# Patient Record
Sex: MALE | Race: WHITE | Employment: OTHER | URBAN - METROPOLITAN AREA
[De-identification: names, ages, dates, MRNs, and addresses within clinical notes are randomized per-mention and may not be internally consistent; named-entity substitution may affect disease eponyms.]

---

## 2017-04-01 ENCOUNTER — TRANSFERRED RECORDS (OUTPATIENT)
Dept: HEALTH INFORMATION MANAGEMENT | Facility: CLINIC | Age: 67
End: 2017-04-01

## 2017-10-03 ENCOUNTER — OFFICE VISIT (OUTPATIENT)
Dept: FAMILY MEDICINE | Facility: CLINIC | Age: 67
End: 2017-10-03
Payer: COMMERCIAL

## 2017-10-03 VITALS
HEIGHT: 70 IN | BODY MASS INDEX: 42.86 KG/M2 | WEIGHT: 299.4 LBS | TEMPERATURE: 97.8 F | OXYGEN SATURATION: 95 % | HEART RATE: 54 BPM | DIASTOLIC BLOOD PRESSURE: 88 MMHG | SYSTOLIC BLOOD PRESSURE: 138 MMHG

## 2017-10-03 DIAGNOSIS — Z23 NEED FOR PROPHYLACTIC VACCINATION AND INOCULATION AGAINST INFLUENZA: ICD-10-CM

## 2017-10-03 DIAGNOSIS — E66.01 MORBID OBESITY (H): ICD-10-CM

## 2017-10-03 DIAGNOSIS — G47.33 OSA (OBSTRUCTIVE SLEEP APNEA): ICD-10-CM

## 2017-10-03 DIAGNOSIS — Z00.00 MEDICARE ANNUAL WELLNESS VISIT, SUBSEQUENT: Primary | ICD-10-CM

## 2017-10-03 DIAGNOSIS — Z13.6 CARDIOVASCULAR SCREENING; LDL GOAL LESS THAN 160: ICD-10-CM

## 2017-10-03 PROCEDURE — 90471 IMMUNIZATION ADMIN: CPT | Performed by: PHYSICIAN ASSISTANT

## 2017-10-03 PROCEDURE — 90662 IIV NO PRSV INCREASED AG IM: CPT | Performed by: PHYSICIAN ASSISTANT

## 2017-10-03 PROCEDURE — 99387 INIT PM E/M NEW PAT 65+ YRS: CPT | Mod: 25 | Performed by: PHYSICIAN ASSISTANT

## 2017-10-03 NOTE — PROGRESS NOTES
Injectable Influenza Immunization Documentation    1.  Is the person to be vaccinated sick today?   No    2. Does the person to be vaccinated have an allergy to a component   of the vaccine?   No    3. Has the person to be vaccinated ever had a serious reaction   to influenza vaccine in the past?   No    4. Has the person to be vaccinated ever had Guillain-Barré syndrome?   No    Form completed by Kenan Hooper CMA

## 2017-10-03 NOTE — PROGRESS NOTES
SUBJECTIVE:   Toby Zuluaga is a 66 year old male who presents for Preventive Visit.      Are you in the first 12 months of your Medicare coverage?  No    HPI    COGNITIVE SCREEN  1) Repeat 3 items (Banana, Sunrise, Chair)    2) Clock draw: NORMAL  3) 3 item recall: Recalls 3 objects  Results: 3 items recalled: COGNITIVE IMPAIRMENT LESS LIKELY    Mini-CogTM Copyright EVA Dempsey. Licensed by the author for use in [facility]; reprinted with permission ([email or web address]). All rights reserved.          Reviewed and updated as needed this visit by clinical staffTobacco  Allergies  Meds  Problems  Med Hx  Surg Hx  Fam Hx  Soc Hx          Reviewed and updated as needed this visit by Provider  Meds  Problems        Social History   Substance Use Topics     Smoking status: Never Smoker     Smokeless tobacco: Never Used     Alcohol use Yes      Comment: occ       The patient does not drink >3 drinks per day nor >7 drinks per week.              Today's PHQ-2 Score: No flowsheet data found.    Do you feel safe in your environment - Yes    Do you have a Health Care Directive?: No: Advance care planning reviewed with patient; information given to patient to review.      Current providers sharing in care for this patient include: Patient Care Team:  Rodri Greer PA-C as PCP - General (Physician Assistant)      Hearing impairment: No    Ability to successfully perform activities of daily living: Yes, no assistance needed     Fall risk:  Fallen 2 or more times in the past year?: No  Any fall with injury in the past year?: No      Home safety:  none identified      The following health maintenance items are reviewed in Epic and correct as of today:Health Maintenance   Topic Date Due     HEPATITIS C SCREENING  10/11/1967     LIPID SCREEN Q5 YR MALE (SYSTEM ASSIGNED)  10/11/1984     COLON CANCER SCREEN (SYSTEM ASSIGNED)  10/11/1999     ADVANCE DIRECTIVE PLANNING Q5 YRS  10/11/2004     TETANUS IMMUNIZATION  "(SYSTEM ASSIGNED)  2009     FALL RISK ASSESSMENT  10/11/2014     PNEUMOCOCCAL (1 of 2 - PCV13) 10/11/2014     AORTIC ANEURYSM SCREENING (SYSTEM ASSIGNED)  10/11/2014     INFLUENZA VACCINE (SYSTEM ASSIGNED)  2017     {Chronicprobdata (Optional):996663}    {Decision Support (Optional):273186}    ROS:  {ROS COMP:797634}    OBJECTIVE:   /88 (BP Location: Right arm, Cuff Size: Adult Large)  Pulse 54  Temp 97.8  F (36.6  C) (Oral)  Ht 5' 10.25\" (1.784 m)  Wt 299 lb 6.4 oz (135.8 kg)  SpO2 95%  BMI 42.65 kg/m2 Estimated body mass index is 42.65 kg/(m^2) as calculated from the following:    Height as of this encounter: 5' 10.25\" (1.784 m).    Weight as of this encounter: 299 lb 6.4 oz (135.8 kg).  EXAM:   {Exam :502714}    ASSESSMENT / PLAN:   {Diag Picklist:264665}    End of Life Planning:  Patient currently has an advanced directive: { :393158}    COUNSELING:  {Medicare Counselin}    {BP Counseling- Complete if BP >= 120/80  (Optional):682554}    Estimated body mass index is 42.65 kg/(m^2) as calculated from the following:    Height as of this encounter: 5' 10.25\" (1.784 m).    Weight as of this encounter: 299 lb 6.4 oz (135.8 kg).  {Weight Management Plan -- Complete if patient has an normal BMI (Optional):944748}   reports that he has never smoked. He has never used smokeless tobacco.  {Tobacco Cessation -- Complete if patient is a smoker (Optional):835922}    Appropriate preventive services were discussed with this patient, including applicable screening as appropriate for cardiovascular disease, diabetes, osteopenia/osteoporosis, and glaucoma.  As appropriate for age/gender, discussed screening for colorectal cancer, prostate cancer, breast cancer, and cervical cancer. Checklist reviewing preventive services available has been given to the patient.    Reviewed patients plan of care and provided an AVS. The {CarePlan:506521} for Toby meets the Care Plan requirement. This Care Plan has " "been established and reviewed with the {PATIENT, FAMILY MEMBER, CAREGIVER:846485}.    Counseling Resources:  ATP IV Guidelines  Pooled Cohorts Equation Calculator  Breast Cancer Risk Calculator  FRAX Risk Assessment  ICSI Preventive Guidelines  Dietary Guidelines for Americans, 2010  USDA's MyPlate  ASA Prophylaxis  Lung CA Screening    Rodri Greer PA-C  [facility]  Injectable Influenza Immunization Documentation    1.  Is the person to be vaccinated sick today?   No    2. Does the person to be vaccinated have an allergy to a component   of the vaccine?   No    3. Has the person to be vaccinated ever had a serious reaction   to influenza vaccine in the past?   No    4. Has the person to be vaccinated ever had Guillain-Barré syndrome?   No    Form completed by Keann Hooper CMA           SUBJECTIVE:   Toby Zuluaga is a 66 year old male who presents for Preventive Visit.  {PVP to remind patient that this is not necessarily a physical exam; physical exam may or may not be done:444011::\"click delete button to remove this line now\"}  {PVP to inform patient that additional E&M charge may apply, if additional problems addressed:919106::\"click delete button to remove this line now\"}  Are you in the first 12 months of your Medicare Part B coverage?  {No Yes:674243::\"No\"}    Healthy Habits:    Do you get at least three servings of calcium containing foods daily (dairy, green leafy vegetables, etc.)? {YES/NO, DAIRY INTAKE:476077::\"yes\"}    Amount of exercise or daily activities, outside of work: {AMOUNT EXERCISE:588007}    Problems taking medications regularly {Yes /No default:115047::\"No\"}    Medication side effects: {Yes /No default.:189601::\"No\"}    Have you had an eye exam in the past two years? {YESNOBLANK:608603}    Do you see a dentist twice per year? {YESNOBLANK:020949}    Do you have sleep apnea, excessive snoring or daytime drowsiness?{YESNOBLANK:257164}    {AWV Cognitive " "Screenin}    {Outside tests to abstract? :697656}    {additional problems to add (Optional):041360}    Reviewed and updated as needed this visit by clinical staff  Tobacco  Allergies  Meds  Problems  Med Hx  Surg Hx  Fam Hx  Soc Hx          Reviewed and updated as needed this visit by Provider  Meds  Problems        Social History   Substance Use Topics     Smoking status: Never Smoker     Smokeless tobacco: Never Used     Alcohol use Yes      Comment: occ       {ETOH AUDIT:464323}    Today's PHQ-2 Score: No flowsheet data found.  {PHQ-2 LOOK IN ASSESSMENTS (Optional) :576665}  Do you feel safe in your environment - {YES/NO/NA:942901}    Do you have a Health Care Directive?: {HEALTHCARE DIRECTIVE STATUS:910917}    Current providers sharing in care for this patient include:   Patient Care Team:  Rodri Greer PA-C as PCP - General (Physician Assistant)      Hearing impairment: {NO/YES:722940}    Ability to successfully perform activities of daily living: {YES/NO (MEDICARE):757447::\"Yes, no assistance needed\"}     Fall risk:  {Document Fall Risk in the Assessments Section of the Navigator:065708}    Home safety:  {IPPE SAFETY CONCERNS:929819::\"none identified\"}  {If any of the above assessments are answered yes, consider ordering appropriate referrals (Optional):003739::\"click delete button to remove this line now\"}    The following health maintenance items are reviewed in Epic and correct as of today:  Health Maintenance   Topic Date Due     HEPATITIS C SCREENING  10/11/1967     LIPID SCREEN Q5 YR MALE (SYSTEM ASSIGNED)  10/11/1984     COLON CANCER SCREEN (SYSTEM ASSIGNED)  10/11/1999     ADVANCE DIRECTIVE PLANNING Q5 YRS  10/11/2004     TETANUS IMMUNIZATION (SYSTEM ASSIGNED)  2009     FALL RISK ASSESSMENT  10/11/2014     PNEUMOCOCCAL (1 of 2 - PCV13) 10/11/2014     AORTIC ANEURYSM SCREENING (SYSTEM ASSIGNED)  10/11/2014     INFLUENZA VACCINE (SYSTEM ASSIGNED)  2017 " "    {Chronicprobdata (Optional):133966}    {Decision Support (Optional):710314}    ROS:  {ROS COMP:548267}    OBJECTIVE:   /88 (BP Location: Right arm, Cuff Size: Adult Large)  Pulse 54  Temp 97.8  F (36.6  C) (Oral)  Ht 5' 10.25\" (1.784 m)  Wt 299 lb 6.4 oz (135.8 kg)  SpO2 95%  BMI 42.65 kg/m2 Estimated body mass index is 42.65 kg/(m^2) as calculated from the following:    Height as of this encounter: 5' 10.25\" (1.784 m).    Weight as of this encounter: 299 lb 6.4 oz (135.8 kg).  EXAM:   {Exam :910187}    ASSESSMENT / PLAN:   {Diag Picklist:085531}    End of Life Planning:  Patient currently has an advanced directive: { :404603}    COUNSELING:  {Medicare Counselin}    {BP Counseling- Complete if BP >= 120/80  (Optional):922579}    Estimated body mass index is 42.65 kg/(m^2) as calculated from the following:    Height as of this encounter: 5' 10.25\" (1.784 m).    Weight as of this encounter: 299 lb 6.4 oz (135.8 kg).  {Weight Management Plan -- Complete if patient has an abnormal BMI (Optional):860705}   reports that he has never smoked. He has never used smokeless tobacco.  {Tobacco Cessation -- Complete if patient is a smoker (Optional):919387}    Appropriate preventive services were discussed with this patient, including applicable screening as appropriate for cardiovascular disease, diabetes, osteopenia/osteoporosis, and glaucoma.  As appropriate for age/gender, discussed screening for colorectal cancer, prostate cancer, breast cancer, and cervical cancer. Checklist reviewing preventive services available has been given to the patient.    Reviewed patients plan of care and provided an AVS. The {CarePlan:044005} for Toby meets the Care Plan requirement. This Care Plan has been established and reviewed with the {PATIENT, FAMILY MEMBER, CAREGIVER:905786}.    Counseling Resources:  ATP IV Guidelines  Pooled Cohorts Equation Calculator  Breast Cancer Risk Calculator  FRAX Risk Assessment  ICSI " Preventive Guidelines  Dietary Guidelines for Americans, 2010  USDA's MyPlate  ASA Prophylaxis  Lung CA Screening    Rodri Greer PA-C  [facility]

## 2017-10-03 NOTE — NURSING NOTE
"Chief Complaint   Patient presents with     Establish Care       Initial /88 (BP Location: Right arm, Cuff Size: Adult Large)  Pulse 54  Temp 97.8  F (36.6  C) (Oral)  Ht 5' 10.25\" (1.784 m)  Wt 299 lb 6.4 oz (135.8 kg)  SpO2 95%  BMI 42.65 kg/m2 Estimated body mass index is 42.65 kg/(m^2) as calculated from the following:    Height as of this encounter: 5' 10.25\" (1.784 m).    Weight as of this encounter: 299 lb 6.4 oz (135.8 kg).  Medication Reconciliation: complete   Kenan Hooper CMA      "

## 2017-10-03 NOTE — MR AVS SNAPSHOT
"              After Visit Summary   10/3/2017    Toby Zuluaga    MRN: [id number]           Patient Information     Date Of Birth          1950        Visit Information        Provider Department      10/3/2017 9:00 AM Rodri Greer PA-C [facility]        Today's Diagnoses     Medicare annual wellness visit, subsequent    -  1    Morbid obesity (H)        JAIME (obstructive sleep apnea)        CARDIOVASCULAR SCREENING; LDL GOAL LESS THAN 160        Need for prophylactic vaccination and inoculation against influenza           Follow-ups after your visit        Your next 10 appointments already scheduled     Oct 03, 2017  9:45 AM CDT   Nurse Only with [facility]INIC NURSE   [facility]    [address] [address] [phone]              Who to contact     If you have questions or need follow up information about today's clinic visit or your schedule please contact [facility] directly at [phone].  Normal or non-critical lab and imaging results will be communicated to you by LoraxAghart, letter or phone within 4 business days after the clinic has received the results. If you do not hear from us within 7 days, please contact the clinic through LoraxAghart or phone. If you have a critical or abnormal lab result, we will notify you by phone as soon as possible.  Submit refill requests through Find That File or call your pharmacy and they will forward the refill request to us. Please allow 3 business days for your refill to be completed.          Additional Information About Your Visit        LoraxAghart Information     Find That File lets you send messages to your doctor, view your test results, renew your prescriptions, schedule appointments and more. To sign up, go to [email or web address] . Click on \"Log in\" on the left side of the screen, which will take you to the Welcome page. Then click on \"Sign up Now\" on the right " "side of the page.     You will be asked to enter the access code listed below, as well as some personal information. Please follow the directions to create your username and password.     Your access code is: [email or web address]  Expires: 2018  9:32 AM     Your access code will  in 90 days. If you need help or a new code, please call your [place] clinic or [phone].        Care EveryWhere ID     This is your Care EveryWhere ID. This could be used by other organizations to access your [place] medical records  [id number]        Your Vitals Were     Pulse Temperature Height Pulse Oximetry BMI (Body Mass Index)       54 97.8  F (36.6  C) (Oral) 5' 10.25\" (1.784 m) 95% 42.65 kg/m2        Blood Pressure from Last 3 Encounters:   10/03/17 138/88    Weight from Last 3 Encounters:   10/03/17 299 lb 6.4 oz (135.8 kg)              We Performed the Following     Vaccine Administration, Initial [73999]        Primary Care Provider Office Phone # Fax #    Rodri Greer PA-C [phone]       [address] [facility] [address]        Equal Access to Services     ZAID ESCOBEDO : Hadii aad ku hadasho Soomaali, waaxda luqadaha, qaybta kaalmada adeegyada, waxay idiin hayaan jolieeg kharanoe lavon . So [facility] [phone].    ATENCIÓN: Si habla español, tiene a wiseman disposición servicios gratuitos de asistencia lingüística. Llame al [phone].    We comply with applicable federal civil rights laws and [place] laws. We do not discriminate on the basis of race, color, national origin, age, disability, sex, sexual orientation, or gender identity.            Thank you!     Thank you for choosing [facility]  for your care. Our goal is always to provide you with excellent care. Hearing back from our patients is one way we can continue to improve our services. Please take a few minutes to complete the written survey that you may receive in the mail after your visit with us. Thank you!      "        Your Updated Medication List - Protect others around you: Learn how to safely use, store and throw away your medicines at www.disposemymeds.org.      Notice  As of 10/3/2017  9:32 AM    You have not been prescribed any medications.

## 2017-10-03 NOTE — PROGRESS NOTES
SUBJECTIVE:   Toby Zuluaga is a 66 year old male who presents for Preventive Visit.    Are you in the first 12 months of your Medicare coverage?  No    Physical   Annual:     Getting at least 3 servings of Calcium per day::  Yes    Bi-annual eye exam::  Yes    Dental care twice a year::  Yes    Sleep apnea or symptoms of sleep apnea::  Sleep apnea    Diet::  Regular (no restrictions) and Carbohydrate counting    Taking medications regularly::  Yes    Medication side effects::  None    Additional concerns today::  No    Here to establish care with a primary clinic and physician.   And he is here today to do a wellness exam  Overall he feels well, no major medical concerns  Tore his rotator cuff this summer, fell off ladder (left)  Doing physical therapy     He is working on weight loss with wife  Improved diet  Doing a lot more walking      COGNITIVE SCREEN  1) Repeat 3 items (Banana, Sunrise, Chair)    2) Clock draw: NORMAL  3) 3 item recall: Recalls 3 objects  Results: 3 items recalled: COGNITIVE IMPAIRMENT LESS LIKELY    Mini-CogTM Copyright EVA Dempsey. Licensed by the author for use in [facility]; reprinted with permission ([email or web address]). All rights reserved.          Reviewed and updated as needed this visit by clinical staffTobacco  Allergies  Meds  Problems  Med Hx  Surg Hx  Fam Hx  Soc Hx          Reviewed and updated as needed this visit by Provider  Meds  Problems        Social History   Substance Use Topics     Smoking status: Never Smoker     Smokeless tobacco: Never Used     Alcohol use Yes      Comment: occ       The patient does not drink >3 drinks per day nor >7 drinks per week.        Today's PHQ-2 Score: PHQ-2 ( 1999 Pfizer) 10/3/2017   Q1: Little interest or pleasure in doing things 0   Q2: Feeling down, depressed or hopeless 0   PHQ-2 Score 0   Q1: Little interest or pleasure in doing things Not at all   Q2: Feeling down, depressed or hopeless Not at all   PHQ-2 Score 0  "      Do you feel safe in your environment - Yes    Do you have a Health Care Directive?: No: Advance care planning reviewed with patient; information given to patient to review.      Current providers sharing in care for this patient include: Patient Care Team:  Rodri Greer PA-C as PCP - General (Physician Assistant)      Hearing impairment: No    Ability to successfully perform activities of daily living: Yes, no assistance needed     Fall risk:  Fallen 2 or more times in the past year?: No  Any fall with injury in the past year?: No      Home safety:  none identified      The following health maintenance items are reviewed in Epic and correct as of today:Health Maintenance   Topic Date Due     HEPATITIS C SCREENING  10/11/1967     LIPID SCREEN Q5 YR MALE (SYSTEM ASSIGNED)  10/11/1984     COLON CANCER SCREEN (SYSTEM ASSIGNED)  10/11/1999     ADVANCE DIRECTIVE PLANNING Q5 YRS  10/11/2004     TETANUS IMMUNIZATION (SYSTEM ASSIGNED)  05/28/2009     FALL RISK ASSESSMENT  10/11/2014     PNEUMOCOCCAL (1 of 2 - PCV13) 10/11/2014     AORTIC ANEURYSM SCREENING (SYSTEM ASSIGNED)  10/11/2014     INFLUENZA VACCINE (SYSTEM ASSIGNED)  09/01/2017     Labs reviewed in EPIC    Reports up to date on pneumonia vaccine    ROS:  Constitutional, HEENT, cardiovascular, pulmonary, gi and gu systems are negative, except as otherwise noted.      OBJECTIVE:   /88 (BP Location: Right arm, Cuff Size: Adult Large)  Pulse 54  Temp 97.8  F (36.6  C) (Oral)  Ht 5' 10.25\" (1.784 m)  Wt 299 lb 6.4 oz (135.8 kg)  SpO2 95%  BMI 42.65 kg/m2 Estimated body mass index is 42.65 kg/(m^2) as calculated from the following:    Height as of this encounter: 5' 10.25\" (1.784 m).    Weight as of this encounter: 299 lb 6.4 oz (135.8 kg).    EXAM:   GENERAL: healthy, alert and no distress  RESP: lungs clear to auscultation - no rales, rhonchi or wheezes  CV: regular rate and rhythm, normal S1 S2, no S3 or S4, no murmur, click or rub, no " "peripheral edema and peripheral pulses strong  MS: no peripheral edema    ASSESSMENT / PLAN:   1. Medicare annual wellness visit, subsequent  Reviewed recommended updates for preventive care. Sounds like he it UTD on many of these. Will await his records    2. Morbid obesity (H)  Recommended strategies for loss. Does need to get moving more    3. JAIME (obstructive sleep apnea)  Wearing cpap nightly    4. CARDIOVASCULAR SCREENING; LDL GOAL LESS THAN 160      5. Need for prophylactic vaccination and inoculation against influenza  - Vaccine Administration, Initial [20382]  - FLU VACCINE, INCREASED ANTIGEN, PRESV FREE, AGE 65+ [49569]  - Vaccine Administration, Initial [18703]    End of Life Planning:  Patient currently has an advanced directive: No.  I have verified the patient's ablity to prepare an advanced directive/make health care decisions.  Literature was provided to assist patient in preparing an advanced directive.    COUNSELING:  Reviewed preventive health counseling, as reflected in patient instructions       Regular exercise       Healthy diet/nutrition       Hepatitis C screening       Colon cancer screening       Prostate cancer screening    BP Screening:   Last 3 BP Readings:    BP Readings from Last 3 Encounters:   10/03/17 138/88       The following was recommended to the patient:  Re-screen BP within a year and recommended lifestyle modifications    Estimated body mass index is 42.65 kg/(m^2) as calculated from the following:    Height as of this encounter: 5' 10.25\" (1.784 m).    Weight as of this encounter: 299 lb 6.4 oz (135.8 kg).  Weight management plan: Discussed healthy diet and exercise guidelines and patient will follow up in 12 months in clinic to re-evaluate.   reports that he has never smoked. He has never used smokeless tobacco.        Appropriate preventive services were discussed with this patient, including applicable screening as appropriate for cardiovascular disease, diabetes, " osteopenia/osteoporosis, and glaucoma.  As appropriate for age/gender, discussed screening for colorectal cancer, prostate cancer, breast cancer, and cervical cancer. Checklist reviewing preventive services available has been given to the patient.    Reviewed patients plan of care and provided an AVS. The Basic Care Plan (routine screening as documented in Health Maintenance) for Toby meets the Care Plan requirement. This Care Plan has been established and reviewed with the Patient.    Counseling Resources:  ATP IV Guidelines  Pooled Cohorts Equation Calculator  Breast Cancer Risk Calculator  FRAX Risk Assessment  ICSI Preventive Guidelines  Dietary Guidelines for Americans, 2010  [employer]'s MyPlate  ASA Prophylaxis  Lung CA Screening    Rodri Greer PA-C  [facility] ROSEMOUNTAnswers for HPI/ROS submitted by the patient on 10/3/2017   PHQ-2 Score: 0    Injectable Influenza Immunization Documentation    1.  Is the person to be vaccinated sick today?   No    2. Does the person to be vaccinated have an allergy to a component   of the vaccine?   No    3. Has the person to be vaccinated ever had a serious reaction   to influenza vaccine in the past?   No    4. Has the person to be vaccinated ever had Guillain-Barré syndrome?   No    Form completed by Kenan Hooper CMA

## 2017-10-05 ENCOUNTER — DOCUMENTATION ONLY (OUTPATIENT)
Dept: FAMILY MEDICINE | Facility: CLINIC | Age: 67
End: 2017-10-05

## 2017-12-15 ENCOUNTER — ALLIED HEALTH/NURSE VISIT (OUTPATIENT)
Dept: NURSING | Facility: CLINIC | Age: 67
End: 2017-12-15
Payer: COMMERCIAL

## 2017-12-15 DIAGNOSIS — Z23 NEED FOR VACCINATION: Primary | ICD-10-CM

## 2017-12-15 PROCEDURE — 90670 PCV13 VACCINE IM: CPT

## 2017-12-15 PROCEDURE — 99207 ZZC NO CHARGE NURSE ONLY: CPT

## 2017-12-15 PROCEDURE — 90471 IMMUNIZATION ADMIN: CPT

## 2018-02-12 ENCOUNTER — OFFICE VISIT (OUTPATIENT)
Dept: FAMILY MEDICINE | Facility: CLINIC | Age: 68
End: 2018-02-12
Payer: COMMERCIAL

## 2018-02-12 VITALS
DIASTOLIC BLOOD PRESSURE: 80 MMHG | BODY MASS INDEX: 44.87 KG/M2 | RESPIRATION RATE: 17 BRPM | TEMPERATURE: 97.8 F | SYSTOLIC BLOOD PRESSURE: 148 MMHG | HEIGHT: 70 IN | WEIGHT: 313.4 LBS | HEART RATE: 56 BPM | OXYGEN SATURATION: 95 %

## 2018-02-12 DIAGNOSIS — M75.122 COMPLETE TEAR OF LEFT ROTATOR CUFF: Primary | ICD-10-CM

## 2018-02-12 DIAGNOSIS — Z01.818 PREOP GENERAL PHYSICAL EXAM: ICD-10-CM

## 2018-02-12 DIAGNOSIS — R03.0 ELEVATED BLOOD PRESSURE READING WITHOUT DIAGNOSIS OF HYPERTENSION: ICD-10-CM

## 2018-02-12 DIAGNOSIS — E66.01 MORBID OBESITY (H): ICD-10-CM

## 2018-02-12 DIAGNOSIS — G47.33 OSA (OBSTRUCTIVE SLEEP APNEA): ICD-10-CM

## 2018-02-12 LAB — HGB BLD-MCNC: 14.9 G/DL (ref 13.3–17.7)

## 2018-02-12 PROCEDURE — 93000 ELECTROCARDIOGRAM COMPLETE: CPT | Performed by: PHYSICIAN ASSISTANT

## 2018-02-12 PROCEDURE — 80048 BASIC METABOLIC PNL TOTAL CA: CPT | Performed by: PHYSICIAN ASSISTANT

## 2018-02-12 PROCEDURE — 85018 HEMOGLOBIN: CPT | Performed by: PHYSICIAN ASSISTANT

## 2018-02-12 PROCEDURE — 99214 OFFICE O/P EST MOD 30 MIN: CPT | Performed by: PHYSICIAN ASSISTANT

## 2018-02-12 PROCEDURE — 36415 COLL VENOUS BLD VENIPUNCTURE: CPT | Performed by: PHYSICIAN ASSISTANT

## 2018-02-12 NOTE — NURSING NOTE
"Chief Complaint   Patient presents with     Pre-Op Exam       Initial /82 (BP Location: Right arm, Patient Position: Chair, Cuff Size: Adult Large)  Pulse 56  Temp 97.8  F (36.6  C) (Tympanic)  Resp 17  Ht 5' 10.25\" (1.784 m)  Wt (!) 313 lb 6.4 oz (142.2 kg)  SpO2 95%  BMI 44.65 kg/m2 Estimated body mass index is 44.65 kg/(m^2) as calculated from the following:    Height as of this encounter: 5' 10.25\" (1.784 m).    Weight as of this encounter: 313 lb 6.4 oz (142.2 kg).  Medication Reconciliation: complete   Beatriz Junior MA       "

## 2018-02-12 NOTE — MR AVS SNAPSHOT
After Visit Summary   2/12/2018    Toby Zuluaga    MRN: [id number]           Patient Information     Date Of Birth          1950        Visit Information        Provider Department      2/12/2018 9:00 AM Rodri Greer PA-C [facility]        Today's Diagnoses     Complete tear of left rotator cuff    -  1    Preop general physical exam        JAIME (obstructive sleep apnea)        Elevated blood pressure reading without diagnosis of hypertension          Care Instructions      Before Your Surgery      Call your surgeon if there is any change in your health. This includes signs of a cold or flu (such as a sore throat, runny nose, cough, rash or fever).    Do not smoke, drink alcohol or take over the counter medicine (unless your surgeon or primary care doctor tells you to) for the 24 hours before and after surgery.    If you take prescribed drugs: Follow your doctor s orders about which medicines to take and which to stop until after surgery.    Eating and drinking prior to surgery: follow the instructions from your surgeon    Take a shower or bath the night before surgery. Use the soap your surgeon gave you to gently clean your skin. If you do not have soap from your surgeon, use your regular soap. Do not shave or scrub the surgery site.  Wear clean pajamas and have clean sheets on your bed.           Follow-ups after your visit        Who to contact     If you have questions or need follow up information about today's clinic visit or your schedule please contact [facility] directly at [phone].  Normal or non-critical lab and imaging results will be communicated to you by MyChart, letter or phone within 4 business days after the clinic has received the results. If you do not hear from us within 7 days, please contact the clinic through MyChart or phone. If you have a critical or abnormal lab result, we will notify you by phone as soon as  "possible.  Submit refill requests through [employer] or call your pharmacy and they will forward the refill request to us. Please allow 3 business days for your refill to be completed.          Additional Information About Your Visit        [employer]borah[employer] Information     [employer] lets you send messages to your doctor, view your test results, renew your prescriptions, schedule appointments and more. To sign up, go to [email or web address] . Click on \"Log in\" on the left side of the screen, which will take you to the Welcome page. Then click on \"Sign up Now\" on the right side of the page.     You will be asked to enter the access code listed below, as well as some personal information. Please follow the directions to create your username and password.     Your access code is: [email or web address]  Expires: 2018  9:38 AM     Your access code will  in 90 days. If you need help or a new code, please call your [facility] clinic or [phone].        Care EveryWhere ID     This is your Care EveryWhere ID. This could be used by other organizations to access your [facility] medical records  [id number]        Your Vitals Were     Pulse Temperature Respirations Height Pulse Oximetry BMI (Body Mass Index)    56 97.8  F (36.6  C) (Tympanic) 17 5' 10.25\" (1.784 m) 95% 44.65 kg/m2       Blood Pressure from Last 3 Encounters:   18 148/80   10/03/17 138/88    Weight from Last 3 Encounters:   18 (!) 313 lb 6.4 oz (142.2 kg)   10/03/17 299 lb 6.4 oz (135.8 kg)              We Performed the Following     Basic metabolic panel     EKG 12-lead complete w/read - Clinics     Hemoglobin        Primary Care Provider Office Phone # Fax #    Rodri Greer PA-C [phone] [address] [place] [address]        Equal Access to Services     ALBINO ESCOBEDO : Hadii aad ku hadasho Soomaali, waaxda luqadaha, qaybta kaalmamicheline perkins, keo morris. [facility] [phone].    ATENCIÓN: " Si habla michoacano, tiene a wiseman disposición servicios gratuitos de asistencia lingüística. Olvin dave [phone].    We comply with applicable federal civil rights laws and [place] laws. We do not discriminate on the basis of race, color, national origin, age, disability, sex, sexual orientation, or gender identity.            Thank you!     Thank you for choosing [facility]  for your care. Our goal is always to provide you with excellent care. Hearing back from our patients is one way we can continue to improve our services. Please take a few minutes to complete the written survey that you may receive in the mail after your visit with us. Thank you!             Your Updated Medication List - Protect others around you: Learn how to safely use, store and throw away your medicines at www.disposemymeds.org.          This list is accurate as of 2/12/18  9:38 AM.  Always use your most recent med list.                   Brand Name Dispense Instructions for use Diagnosis    ALEVE PO           UNABLE TO FIND      MEDICATION NAME: Arid, uses for eyes

## 2018-02-12 NOTE — PROGRESS NOTES
[facility]  [address] [id number] [phone]  Dept: [phone]    PRE-OP EVALUATION:  Today's date: 2018    Toby Zuluaga (: 1950) presents for pre-operative evaluation assessment as requested by Surgeon.  He requires evaluation and anesthesia risk assessment prior to undergoing surgery/procedure for treatment of Left rotator cuff tear.  Proposed procedure: Left Shoulder Repair     Date of Surgery/ Procedure: 18 - Left Shoulder Repair   Time of Surgery/ Procedure: 9:00am  Hospital/Surgical Facility: [facility]   Fax number for surgical facility: [phone]  Primary Physician: Rodri Greer  Type of Anesthesia Anticipated: General    Patient has a Health Care Directive or Living Will:  YES, Health Care Directive     Preop Questions 2018   1.  Do you have a history of heart attack, stroke, stent, bypass or surgery on an artery in the head, neck, heart or legs? No   2.  Do you ever have any pain or discomfort in your chest? No   3.  Do you have a history of  Heart Failure? No   4.   Are you troubled by shortness of breath when:  walking on a level surface, or up a slight hill, or at night? No   5.  Do you currently have a cold, bronchitis or other respiratory infection? No   6.  Do you have a cough, shortness of breath, or wheezing? No   7.  Do you sometimes get pains in the calves of your legs when you walk? No   8. Do you or anyone in your family have previous history of blood clots? No   9.  Do you or does anyone in your family have a serious bleeding problem such as prolonged bleeding following surgeries or cuts? No   10. Have you ever had problems with anemia or been told to take iron pills? No   11. Have you had any abnormal blood loss such as black, tarry or bloody stools? No   12. Have you ever had a blood transfusion? No   13. Have you or any of your relatives ever had problems with anesthesia? No   14. Do you have  sleep apnea, excessive snoring or daytime drowsiness? YES - JAIME, using CPAP   15. Do you have any prosthetic heart valves? No   16. Do you have prosthetic joints? No         HPI:                                                      Brief HPI related to upcoming procedure: Patient was moving and slipped and fell directly onto an outstretched hand. MRI showed complete tear of the left rotator cuff. He tried physical therapy but there was no full recover.     SLEEP PROBLEM - Patient has a longstanding history of JAIME of moderate severity. Using CPAP                                                                                                                       .    MEDICAL HISTORY:                                                      Patient Active Problem List    Diagnosis Date Noted     JAIME (obstructive sleep apnea) 10/03/2017     Priority: Medium     CARDIOVASCULAR SCREENING; LDL GOAL LESS THAN 160 10/03/2017     Priority: Medium     Morbid obesity (H) 10/03/2017     Priority: Medium      No past medical history on file.  Past Surgical History:   Procedure Laterality Date     ARTHROSCOPY KNEE BILATERAL      3 left, 2 knee     AS YAG LASER CAPSULOTOMY Bilateral      REPAIR TENDON ANKLE Left      No current outpatient prescriptions on file.     OTC products: aleve twice daily    No Known Allergies   Latex Allergy: NO    Social History   Substance Use Topics     Smoking status: Never Smoker     Smokeless tobacco: Never Used     Alcohol use Yes      Comment: occ     History   Drug Use No       REVIEW OF SYSTEMS:                                                    C: NEGATIVE for fever, chills, change in weight  I: NEGATIVE for worrisome rashes, moles or lesions  E: NEGATIVE for vision changes or irritation  E/M: NEGATIVE for ear, mouth and throat problems  R: NEGATIVE for significant cough or SOB  B: NEGATIVE for masses, tenderness or discharge  CV: NEGATIVE for chest pain, palpitations or peripheral edema  GI:  "NEGATIVE for nausea, abdominal pain, heartburn, or change in bowel habits  : NEGATIVE for frequency, dysuria, or hematuria  MUSCULOSKELETAL:POSITIVE  for left shoulder pain  N: NEGATIVE for weakness, dizziness or paresthesias  E: NEGATIVE for temperature intolerance, skin/hair changes  H: NEGATIVE for bleeding problems  P: NEGATIVE for changes in mood or affect    EXAM:                                                    /82 (BP Location: Right arm, Patient Position: Chair, Cuff Size: Adult Large)  Pulse 56  Temp 97.8  F (36.6  C) (Tympanic)  Resp 17  Ht 5' 10.25\" (1.784 m)  Wt (!) 313 lb 6.4 oz (142.2 kg)  SpO2 95%  BMI 44.65 kg/m2    GENERAL APPEARANCE: healthy, alert and no distress     EYES: EOMI,  PERRL     HENT: ear canals and TM's normal and nose and mouth without ulcers or lesions     NECK: no adenopathy, no asymmetry, masses, or scars and thyroid normal to palpation     RESP: lungs clear to auscultation - no rales, rhonchi or wheezes     CV: regular rates and rhythm     MS: Left shoulder not examined; radial pulse full     NEURO: Normal strength and tone, sensory exam grossly normal, mentation intact and speech normal     PSYCH: mentation appears normal. and affect normal/bright    DIAGNOSTICS:                                                    EKG: Normal Sinus Rhythm, normal axis, no acute ST/T changes c/w ischemia, scattered PVC  Hemoglobin: 14.9  Serum Potassium: 3.8  Serum Creatinine: 0.92    IMPRESSION:                                                    Reason for surgery/procedure: Left rotator cuff tear  Diagnosis/reason for consult: Pre-operative exam    The proposed surgical procedure is considered INTERMEDIATE risk.    REVISED CARDIAC RISK INDEX  The patient has the following serious cardiovascular risks for perioperative complications such as (MI, PE, VFib and 3  AV Block):  No serious cardiac risks  INTERPRETATION: 0 risks: Class I (very low risk - 0.4% complication rate)    The " patient has the following additional risks for perioperative complications:  No identified additional risks      ICD-10-CM    1. Complete tear of left rotator cuff M75.122 Basic metabolic panel     Hemoglobin     EKG 12-lead complete w/read - Clinics   2. Preop general physical exam Z01.818 Basic metabolic panel     Hemoglobin   3. JAIME (obstructive sleep apnea) G47.33 EKG 12-lead complete w/read - Clinics   4. Morbid obesity (H) E66.01    5. Elevated blood pressure reading without diagnosis of hypertension R03.0 Will need rechecking following surgery.        RECOMMENDATIONS:                                                        Obstructive Sleep Apnea (or suspected sleep apnea)  Patient is to bring their home CPAP with them on the day of surgery  Hospital staff are advised to monitor for sleep related oxygen desaturations during surgery    --Pt advised to avoid NSAIDS (Motrin, Ibuprofen, Aleve or Naprosyn);  If needed, Tylenol or Acetaminophen are fine to use.        --Pain medications, time off from work and FMLA following surgery deferred to surgeon.    APPROVAL GIVEN to proceed with proposed procedure, without further diagnostic evaluation       Signed Electronically by: Rodri Greer PA-C    Copy of this evaluation report is provided to requesting physician.    [facility] Preop Guidelines

## 2018-02-13 LAB
ANION GAP SERPL CALCULATED.3IONS-SCNC: 5 MMOL/L (ref 3–14)
BUN SERPL-MCNC: 21 MG/DL (ref 7–30)
CALCIUM SERPL-MCNC: 8.6 MG/DL (ref 8.5–10.1)
CHLORIDE SERPL-SCNC: 109 MMOL/L (ref 94–109)
CO2 SERPL-SCNC: 30 MMOL/L (ref 20–32)
CREAT SERPL-MCNC: 0.92 MG/DL (ref 0.66–1.25)
GFR SERPL CREATININE-BSD FRML MDRD: 81 ML/MIN/1.7M2
GLUCOSE SERPL-MCNC: 102 MG/DL (ref 70–99)
POTASSIUM SERPL-SCNC: 3.8 MMOL/L (ref 3.4–5.3)
SODIUM SERPL-SCNC: 144 MMOL/L (ref 133–144)

## 2018-12-10 ENCOUNTER — OFFICE VISIT - HEALTHEAST (OUTPATIENT)
Dept: AUDIOLOGY | Facility: CLINIC | Age: 68
End: 2018-12-10

## 2018-12-10 DIAGNOSIS — H90.3 SENSORINEURAL HEARING LOSS, BILATERAL: ICD-10-CM

## 2019-01-28 NOTE — PROGRESS NOTES
SUBJECTIVE:   Toby Zuluaga is a 68 year old male who presents to clinic today for the following health issues:    RESPIRATORY SYMPTOMS    Duration: 2 weeks     Description  nasal congestion and cough    Severity: moderate    Accompanying signs and symptoms: None    History (predisposing factors):  none    Precipitating or alleviating factors: None    Therapies tried and outcome:  none    -Patient is a 69yo male who presents today with 2 week hx of upper respiratory symptoms   -this follows a bout of a stomach bug  -There is an intermittent cough  -Increased nasal congestion; seems to correlate with the coughing  -waking up congestion  -there are no throat or ear symptoms  -he denies any shortness of breath; no shortness of breath  -sleeping ok  -feeling lower energy  -BMs are normal now  -Has not tried any otc treatments  -no fevers, chills      Problem list and histories reviewed & adjusted, as indicated.  Additional history: as documented    Patient Active Problem List   Diagnosis     JAIME (obstructive sleep apnea)     CARDIOVASCULAR SCREENING; LDL GOAL LESS THAN 160     Morbid obesity (H)     Past Surgical History:   Procedure Laterality Date     ARTHROSCOPY KNEE BILATERAL      3 left, 2 right     AS YAG LASER CAPSULOTOMY Bilateral      REPAIR TENDON ANKLE Left        Social History     Tobacco Use     Smoking status: Never Smoker     Smokeless tobacco: Never Used   Substance Use Topics     Alcohol use: Yes     Comment: occ     Family History   Problem Relation Age of Onset     Chronic Obstructive Pulmonary Disease Mother      Alzheimer Disease Father      Diabetes No family hx of      Hypertension No family hx of            Reviewed and updated as needed this visit by clinical staff       Reviewed and updated as needed this visit by Provider         ROS:  Constitutional, HEENT, cardiovascular, pulmonary, gi and gu systems are negative, except as otherwise noted.    OBJECTIVE:     /90   Pulse 60    "Temp 98.6  F (37  C) (Tympanic)   Resp 12   Ht 1.784 m (5' 10.25\")   Wt 141.7 kg (312 lb 8 oz)   SpO2 96%   BMI 44.52 kg/m    Body mass index is 44.52 kg/m .  GENERAL: healthy, alert and no distress  EYES: Eyes grossly normal to inspection, PERRL and conjunctivae and sclerae normal  HENT: ear canals and TM's normal, nose and mouth without ulcers or lesions  RESP: lungs clear to auscultation - no rales, rhonchi or wheezes  CV: regular rates and rhythm, normal S1 S2, no S3 or S4 and no murmur, click or rub    Diagnostic Test Results:  none     ASSESSMENT/PLAN:   1. Viral URI with cough  Lungs and upper respiratory exam grossly clear. Suspect pnd causing some aspect of cough. He has not used any otc so will have him start. If not improving he will let us know and ok to consider abx for sinus.    2. Elevated blood pressure reading without diagnosis of hypertension  Elevated. Reviewed old BP readings from therapy this summer and well controlled. Unclear if this is from his illness or now elevated. Reviewed DASH and recommendations; Follow-up in one month and if still elevated will recommend treatment      Rodri Greer PA-C  [facility]  "

## 2019-01-29 ENCOUNTER — OFFICE VISIT (OUTPATIENT)
Dept: FAMILY MEDICINE | Facility: CLINIC | Age: 69
End: 2019-01-29
Payer: COMMERCIAL

## 2019-01-29 VITALS
HEART RATE: 60 BPM | RESPIRATION RATE: 12 BRPM | SYSTOLIC BLOOD PRESSURE: 160 MMHG | DIASTOLIC BLOOD PRESSURE: 90 MMHG | OXYGEN SATURATION: 96 % | WEIGHT: 312.5 LBS | TEMPERATURE: 98.6 F | BODY MASS INDEX: 44.74 KG/M2 | HEIGHT: 70 IN

## 2019-01-29 DIAGNOSIS — R03.0 ELEVATED BLOOD PRESSURE READING WITHOUT DIAGNOSIS OF HYPERTENSION: ICD-10-CM

## 2019-01-29 DIAGNOSIS — J06.9 VIRAL URI WITH COUGH: Primary | ICD-10-CM

## 2019-01-29 PROCEDURE — 99213 OFFICE O/P EST LOW 20 MIN: CPT | Performed by: PHYSICIAN ASSISTANT

## 2019-01-29 ASSESSMENT — MIFFLIN-ST. JEOR: SCORE: 2192.71

## 2019-01-29 NOTE — PATIENT INSTRUCTIONS
Dietary Approaches to Stop Hypertension (The DASH Diet)   What is hypertension?   Hypertension is the term for blood pressure that is consistently higher than normal. Blood pressure is the force of blood against artery walls. Blood pressure can be unhealthy if it is above 120/80. The higher your blood pressure, the greater the health risk.     High blood pressure can be controlled if you take these steps:   Maintain a healthy weight.   Be physically active.   Follow a healthy eating plan, which includes foods lower in salt and sodium.   If you drink alcoholic beverages, do so in moderation.   As noted in this list, diet affects high blood pressure. Following the DASH diet and reducing the amount of sodium in your diet will help lower your blood pressure. It will also help prevent high blood pressure.     What is the DASH diet?   Dietary Approaches to Stop Hypertension (DASH) is a diet that is low in saturated fat, cholesterol, and total fat. It emphasizes fruits, vegetables, and low-fat dairy foods. The DASH diet also includes whole-grain products, fish, poultry, and nuts. It encourages fewer servings of red meat, sweets, and sugar-containing beverages. It is rich in magnesium, potassium, and calcium, as well as protein and fiber.     How do I get started on the DASH diet?   The DASH diet requires no special foods and has no hard-to-follow recipes. Start by seeing how DASH compares with your current eating habits.  The DASH eating plan shown is based on 2,000 calories a day. Your health care provider or a dietitian can help you determine how many calories a day you need. Most adults need somewhere between 1600 and 2800 calories a day. Serving sizes will vary between 1/2 cup and 1 1/4 cups.     Check the product's nutrition label to determine serving sizes of particular products.    Food Group   Number of Servings   Examples of Serving Size    Grains and grain products   7 to 8   1 slice of bread    1 cup  ready-to-eat cold cereal    1/2 cup cooked rice, pasta, or   cereal    Vegetables   4 to 5   1 cup raw leafy vegetable    1/2 cup cooked vegetable    6 oz. vegetable juice    Fruits   4 to 5   1 medium fruit       1/4 cup dried fruit    1/2 cup fresh, frozen, or canned fruit    6 oz fruit juice    Low-fat or fat-free dairy foods   2 to 3   8 oz milk    1 cup yogurt    1 1/2 oz cheese    Lean meats, poultry, or fish   2 or fewer   3 oz cooked lean meat, skinless poultry, or fish    Nuts, seeds, and dry beans   4 to 5 per week   1/3 cup or 1 1/2 oz nuts    1 tablespoon or 1/2 oz seeds    1/2 cup cooked dry beans     Fats and oils   2 to 3   1 teaspoon soft margarine    1 tablespoon low-fat mayonnaise    2 tablespoons light salad dressing    1 teaspoon vegetable oil   Sweets   5 per week   1 tablespoon sugar              8 oz lemonade    1 tablespoon jelly or jam     1/2 oz jelly beans     Make changes gradually. Here are some suggestions that might help:     If you now eat 1 or 2 servings of vegetables a day, add a serving at lunch and another at dinner.   If you don't eat fruit now or have only juice at breakfast, add a serving to your meals or have it as a snack.   Drink milk or water with lunch or dinner instead of soda, sugar-sweetened tea, or alcohol. Choose low-fat (1%) or fat-free (skim) dairy products to reduce how much saturated fat, total fat, cholesterol, and calories you eat. If you have trouble digesting dairy products, try taking lactase enzyme pills or drops (available at drugstores and groceries) with the dairy foods. Or buy lactose-free milk or milk with lactase enzyme added to it.   Read food labels on margarines and salad dressings to choose products lowest in fat.   If you now eat large portions of meat, cut back gradually--by a half or a third at each meal. Limit meat to 6 ounces a day (2 servings). Three to four ounces is about the size of a deck of cards.   Have 2 or more vegetarian-style  (meatless) meals each week. Increase servings of vegetables, rice, pasta, and beans in all meals. Try casseroles and pasta, and stir-lozano dishes, which have less meat and more vegetables, grains, and beans.   Use fruits canned in their own juice. Fresh fruits require little or no preparation. Dried fruits are a good choice to carry with you or to have ready in the car.   Try these snacks ideas: unsalted pretzels or nuts mixed with raisins, diallo crackers, low-fat and fat-free yogurt and frozen yogurt, popcorn with no salt or butter added, and raw vegetables.   Choose whole grain foods to get more nutrients, including minerals and fiber. For example, choose whole-wheat bread or whole-grain cereals.   Use fresh, frozen, or no-salt-added canned vegetables.     Remember to also reduce the salt and sodium in your diet. Try to have no more than 2000 milligrams (mg) of sodium per day, with a goal of further reducing the sodium to 1500 mg per day. Three important ways to reduce sodium are:     Use reduced-sodium or no-salt-added food products.   Use less salt when you prepare foods and do not add salt to your food at the table.   Read fool labels. Aim for foods that are less than 5 percent of the daily value of sodium.     The DASH eating plan was not designed for weight loss. But it contains many lower calorie foods, such as fruits and vegetables. You can make it lower in calories by replacing higher calorie foods with more fruits and vegetables. Some ideas to increase fruits and vegetables and decrease calories include:     Eat a medium apple instead of four shortbread cookies. You'll save 80 calories.   Eat 1/4 cup of dried apricots instead of a 2-ounce bag of pork rinds. You'll save 230 calories.   Have a hamburger that's 3 ounces instead of 6 ounces. Add a 1/2 cup serving of carrots and a 1/2 cup serving of spinach. You'll save more than 200 calories.   Instead of 5 ounces of chicken, have a stir lozano with 2 ounces of  chicken and 1 and 1/2 cups of raw vegetables. Use a small amount of vegetable oil. You'll save 50 calories.   Have a 1/2 cup serving of low-fat frozen yogurt instead of a 1 and 1/2 ounce milk chocolate bar. You'll save about 110 calories.   Use low-fat or fat-free condiments, such as fat free salad dressings.   Eat smaller portions--cut back gradually.   Use food labels to compare fat content in packaged foods. Items marked low-fat or fat-free may be lower in fat without being lower in calories than their regular versions.   Limit foods with lots of added sugar, such as pies, flavored yogurts, candy bars, ice cream, sherbet, regular soft drinks, and fruit drinks.   Drink water or club soda instead of cola or other soda drinks.     Based on National Institutes of Health Guidelines. Published by [employer] ([employer]   Copyright   2004 [employer] and/or one of its subsidiaries. All Rights Reserved.

## 2019-02-25 NOTE — PROGRESS NOTES
"  SUBJECTIVE:   Toby Zuluaga is a 68 year old male who presents to clinic today for the following health issues:    Patient is here to follow up on BP from previous appt.   We had discussed his rising BP over the past few measurements  He denies any gross vision changes  He does not have HA or chest pain  He does not recall family hx of elevated blood pressure  He does have JAIME, wears CPAP nightly  He has continued with his routine meal prep   -adding very little salt  Was doing a lot more walking with nicer weather; less so with winter   -Does have silver sneakers thru the [facility] but has not followed with this much      Problem list and histories reviewed & adjusted, as indicated.  Additional history: as documented    Patient Active Problem List   Diagnosis     JAIME (obstructive sleep apnea)     CARDIOVASCULAR SCREENING; LDL GOAL LESS THAN 160     Morbid obesity (H)     Past Surgical History:   Procedure Laterality Date     ARTHROSCOPY KNEE BILATERAL      3 left, 2 right     AS YAG LASER CAPSULOTOMY Bilateral      REPAIR TENDON ANKLE Left        Social History     Tobacco Use     Smoking status: Never Smoker     Smokeless tobacco: Never Used   Substance Use Topics     Alcohol use: Yes     Comment: occ     Family History   Problem Relation Age of Onset     Chronic Obstructive Pulmonary Disease Mother      Alzheimer Disease Father      Diabetes No family hx of      Hypertension No family hx of            Reviewed and updated as needed this visit by clinical staff       Reviewed and updated as needed this visit by Provider         ROS:  Constitutional, HEENT, cardiovascular, pulmonary, gi and gu systems are negative, except as otherwise noted.    OBJECTIVE:     /86   Pulse 68   Temp 97.6  F (36.4  C) (Tympanic)   Resp 16   Ht 1.784 m (5' 10.25\")   Wt 142.7 kg (314 lb 9.6 oz)   SpO2 97%   BMI 44.82 kg/m    Body mass index is 44.82 kg/m .  GENERAL: healthy, alert and no distress  EYES: Eyes grossly normal " to inspection, PERRL and conjunctivae and sclerae normal  NECK: no carotid bruits  RESP: lungs clear to auscultation - no rales, rhonchi or wheezes  CV: regular rate and rhythm, normal S1 S2, no S3 or S4, no murmur, click or rub, no peripheral edema and peripheral pulses strong  MS: No peripheral edema     Diagnostic Test Results:  none     ASSESSMENT/PLAN:     1. Essential hypertension  New dx. Reviewed dietary and exercise recommendations. Screen BMP today. Discussed side effects to medications. Will follow up in one month for recheck.   - Basic metabolic panel  - order for DME; Equipment being ordered: Digital home blood pressure monitor kit  Dispense: 1 Device; Refill: 0  - lisinopril (PRINIVIL/ZESTRIL) 5 MG tablet; Take 1 tablet (5 mg) by mouth daily  Dispense: 90 tablet; Refill: 0      Rodri Greer PA-C  [facility]

## 2019-02-26 ENCOUNTER — OFFICE VISIT (OUTPATIENT)
Dept: FAMILY MEDICINE | Facility: CLINIC | Age: 69
End: 2019-02-26
Payer: COMMERCIAL

## 2019-02-26 VITALS
BODY MASS INDEX: 45.04 KG/M2 | RESPIRATION RATE: 16 BRPM | WEIGHT: 314.6 LBS | OXYGEN SATURATION: 97 % | SYSTOLIC BLOOD PRESSURE: 152 MMHG | TEMPERATURE: 97.6 F | DIASTOLIC BLOOD PRESSURE: 86 MMHG | HEART RATE: 68 BPM | HEIGHT: 70 IN

## 2019-02-26 DIAGNOSIS — I10 ESSENTIAL HYPERTENSION: Primary | ICD-10-CM

## 2019-02-26 PROCEDURE — 80048 BASIC METABOLIC PNL TOTAL CA: CPT | Performed by: PHYSICIAN ASSISTANT

## 2019-02-26 PROCEDURE — 99214 OFFICE O/P EST MOD 30 MIN: CPT | Performed by: PHYSICIAN ASSISTANT

## 2019-02-26 PROCEDURE — 36415 COLL VENOUS BLD VENIPUNCTURE: CPT | Performed by: PHYSICIAN ASSISTANT

## 2019-02-26 RX ORDER — LISINOPRIL 5 MG/1
5 TABLET ORAL DAILY
Qty: 90 TABLET | Refills: 0 | Status: SHIPPED | OUTPATIENT
Start: 2019-02-26 | End: 2019-04-20

## 2019-02-26 ASSESSMENT — MIFFLIN-ST. JEOR: SCORE: 2202.24

## 2019-02-27 LAB
ANION GAP SERPL CALCULATED.3IONS-SCNC: 11 MMOL/L (ref 3–14)
BUN SERPL-MCNC: 18 MG/DL (ref 7–30)
CALCIUM SERPL-MCNC: 8.4 MG/DL (ref 8.5–10.1)
CHLORIDE SERPL-SCNC: 108 MMOL/L (ref 94–109)
CO2 SERPL-SCNC: 23 MMOL/L (ref 20–32)
CREAT SERPL-MCNC: 0.87 MG/DL (ref 0.66–1.25)
GFR SERPL CREATININE-BSD FRML MDRD: 88 ML/MIN/{1.73_M2}
GLUCOSE SERPL-MCNC: 116 MG/DL (ref 70–99)
POTASSIUM SERPL-SCNC: 3.9 MMOL/L (ref 3.4–5.3)
SODIUM SERPL-SCNC: 142 MMOL/L (ref 133–144)

## 2019-03-04 PROBLEM — I10 HTN (HYPERTENSION): Status: ACTIVE | Noted: 2019-03-04

## 2019-04-05 NOTE — PROGRESS NOTES
"  SUBJECTIVE:   Toby Zuluaga is a 68 year old male who presents to clinic today for the following health issues:    Hypertension Follow-up    Outpatient blood pressures are not being checked.    Low Salt Diet: no added salt    Amount of exercise or physical activity: Walking up and down stairs     Problems taking medications regularly: No    Medication side effects: Possible side effects from Lisinopril, dry eyes     Diet: regular (no restrictions)    -Toby presents to follow up after starting lisinopril around 6 weeks ago  -he initially noted some lightheadedness when arising in the morning but this improved   -maybe some dry eyes as well? Otherwise tolerated  -not checking at home but checked one time when donating blood and was around 150/100  -does continue to focus on limiting salt; has gotten less active because he \"tweaked the back\" when shoveling\" and the symptoms have continued  -will get sharp pains with rotation of the trunk in the right lower back   -nothing into the lower extremities   -can have times when the symptoms are not painful     Problem list and histories reviewed & adjusted, as indicated.  Additional history: as documented    Patient Active Problem List   Diagnosis     JAIME (obstructive sleep apnea)     CARDIOVASCULAR SCREENING; LDL GOAL LESS THAN 160     Morbid obesity (H)     HTN (hypertension)     Past Surgical History:   Procedure Laterality Date     ARTHROSCOPY KNEE BILATERAL      3 left, 2 right     AS YAG LASER CAPSULOTOMY Bilateral      REPAIR TENDON ANKLE Left        Social History     Tobacco Use     Smoking status: Never Smoker     Smokeless tobacco: Never Used   Substance Use Topics     Alcohol use: Yes     Comment: occ     Family History   Problem Relation Age of Onset     Chronic Obstructive Pulmonary Disease Mother      Alzheimer Disease Father      Diabetes No family hx of      Hypertension No family hx of            Reviewed and updated as needed this visit by clinical " "staff       Reviewed and updated as needed this visit by Provider         ROS:  Constitutional, HEENT, cardiovascular, pulmonary, gi and gu systems are negative, except as otherwise noted.    OBJECTIVE:     /82   Pulse 63   Temp 98.4  F (36.9  C) (Tympanic)   Resp 17   Ht 1.784 m (5' 10.25\")   Wt 143.2 kg (315 lb 9.6 oz)   SpO2 96%   BMI 44.96 kg/m    Body mass index is 44.96 kg/m .  GENERAL: healthy, alert and no distress  RESP: lungs clear to auscultation - no rales, rhonchi or wheezes  CV: regular rates and rhythm  MS/BACK: there is mild ttp along the low right paraspinal. He moves slowly from the chair so as not to incite pain. Truncal rotation causes pain. Negative SLR    Diagnostic Test Results:  Update BMP    ASSESSMENT/PLAN:   1. Essential hypertension  Without adequate control. His BP did come down in clinic but he's had numerous other times where it's elevated including at home and donating blood. We'll update the BMP today and have him double the medication to 10mg. Then i'd like him to recheck at the pharmacy to ensure control   - Basic metabolic panel    2. Morbid obesity (H)  He needs to get to work on this. Reviewed diet and exercise recs    3. Acute right-sided low back pain without sciatica  No red flag symptoms. Did recommend therapy today however he preferred to hold off and will trial heat and home activities. Will call if deciding he'll move forward with the therapy and I will place referral      Rodri Greer PA-C  [facility]  "

## 2019-04-08 ENCOUNTER — OFFICE VISIT (OUTPATIENT)
Dept: FAMILY MEDICINE | Facility: CLINIC | Age: 69
End: 2019-04-08
Payer: COMMERCIAL

## 2019-04-08 VITALS
HEIGHT: 70 IN | SYSTOLIC BLOOD PRESSURE: 134 MMHG | TEMPERATURE: 98.4 F | HEART RATE: 63 BPM | WEIGHT: 315 LBS | DIASTOLIC BLOOD PRESSURE: 82 MMHG | OXYGEN SATURATION: 96 % | RESPIRATION RATE: 17 BRPM | BODY MASS INDEX: 45.1 KG/M2

## 2019-04-08 DIAGNOSIS — M54.50 ACUTE RIGHT-SIDED LOW BACK PAIN WITHOUT SCIATICA: ICD-10-CM

## 2019-04-08 DIAGNOSIS — E66.01 MORBID OBESITY (H): ICD-10-CM

## 2019-04-08 DIAGNOSIS — I10 ESSENTIAL HYPERTENSION: Primary | ICD-10-CM

## 2019-04-08 PROCEDURE — 36415 COLL VENOUS BLD VENIPUNCTURE: CPT | Performed by: PHYSICIAN ASSISTANT

## 2019-04-08 PROCEDURE — 80048 BASIC METABOLIC PNL TOTAL CA: CPT | Performed by: PHYSICIAN ASSISTANT

## 2019-04-08 PROCEDURE — 99214 OFFICE O/P EST MOD 30 MIN: CPT | Performed by: PHYSICIAN ASSISTANT

## 2019-04-08 ASSESSMENT — MIFFLIN-ST. JEOR: SCORE: 2206.77

## 2019-04-08 NOTE — PATIENT INSTRUCTIONS
Let's have you start taking 2 lisinopril tabs (10mg total). When youre back from [place], in around 1 week, please schedule a nurse only visit to recheck the pressure to see if we're making good head way.    Start using some heat for the back in the tender area. A short few days trial of naproxen twice daily is ok too. Can try the exercises below as well.        When You Have Low Back Pain    Caring for Your Back  You are not alone.    Low back pain is very common. Nearly half of all adults have low back pain in any given year. The good news is that back pain is rarely a danger to your health. Most people can manage their back pain on their own and about half of them start feeling better within 2 weeks. In 9 out of 10 cases, low back pain goes away or no longer limits daily activity within 6 weeks.     Your outlook is good!    Your symptoms tell us that your low back pain is most likely not a danger to you. Most of the time we do not know the exact cause of low back pain, even if you see a doctor or have an MRI. However, treatment can still work without knowing the cause of the pain. Less than 1 in 100 people need surgery for their back pain.     What can I do about my low back pain?     There are three things you can do to ease low back pain and help it go away.    Use heat or cold packs.    Take medicine as directed.    Use positions, movements and exercises.     Using heat or cold packs    Try cold packs or gentle heat to ease your pain. Use whichever gives you the most relief. Apply the cold pack or heat for 15 minutes at a time, as often as needed.    Taking medicine      If your doctor has prescribed medicine, be sure to follow the directions.    If you take over-the-counter medicine, read and follow the directions.    Talk to your doctor if you have any questions.     Using positions, movements and exercises    Research tells us that moving your joints and muscles can help you recover from back pain. Such  activity should be simple and gentle. Use the positions in the photos as well as walking to help relieve your pain. Try taking a short walk every 3 to 4 hours during the day. Walk for a few minutes inside your home or take longer walks outside, on a treadmill or at a mall. Slowly increase the amount of time you walk. Expect discomfort when you begin, but it should lessen as your back starts to heal. When your back feels better, walk daily to keep your back and body healthy.    Finding a comfortable position    When your back pain is new, certain positions will ease your pain. Gently try each of the positions below until you find one that is helpful. Once you find a position of comfort, use it as often as you like when you are resting. You will recover faster if you combine rest with activity.         Lie on your back with your legs bent. You can do this by placing a pillow under your knees. Or you may lie on the floor and rest your lower legs on the seat of a chair.       Lie on your side with your knees bent, and place a pillow between your knees.       Lie on your stomach over pillows.      When should I call my doctor?    Your back pain should improve over the first couple of weeks. As it improves, you should be able to return to your normal activities. But call your doctor if:    You have a sudden change in your ability to control your bladder or bowels.    You feel tingling in your groin or legs.    The pain spreads down your leg and into your foot.    Your toes, feet or leg muscles feel weak.    You feel generally unwell or sick.    Your pain does not get better or gets worse.      If you are deaf or hard of hearing, please let us know. We provide many free services including sign language interpreters,oral interpreters, TTYs, telephone amplifiers, note takers and written materials.    For informational purposes only. Not to replace the advice of your health care provider. Copyright   2013 [facility]  Services. All rights reserved. [employer] [id number] - Rev 06/14.

## 2019-04-09 LAB
ANION GAP SERPL CALCULATED.3IONS-SCNC: 3 MMOL/L (ref 3–14)
BUN SERPL-MCNC: 19 MG/DL (ref 7–30)
CALCIUM SERPL-MCNC: 8.3 MG/DL (ref 8.5–10.1)
CHLORIDE SERPL-SCNC: 109 MMOL/L (ref 94–109)
CO2 SERPL-SCNC: 29 MMOL/L (ref 20–32)
CREAT SERPL-MCNC: 0.91 MG/DL (ref 0.66–1.25)
GFR SERPL CREATININE-BSD FRML MDRD: 86 ML/MIN/{1.73_M2}
GLUCOSE SERPL-MCNC: 98 MG/DL (ref 70–99)
POTASSIUM SERPL-SCNC: 4.3 MMOL/L (ref 3.4–5.3)
SODIUM SERPL-SCNC: 141 MMOL/L (ref 133–144)

## 2019-04-18 ENCOUNTER — ALLIED HEALTH/NURSE VISIT (OUTPATIENT)
Dept: FAMILY MEDICINE | Facility: CLINIC | Age: 69
End: 2019-04-18

## 2019-04-18 VITALS — DIASTOLIC BLOOD PRESSURE: 84 MMHG | SYSTOLIC BLOOD PRESSURE: 145 MMHG

## 2019-04-18 DIAGNOSIS — I10 ESSENTIAL HYPERTENSION: Primary | ICD-10-CM

## 2019-04-18 PROCEDURE — 99207 ZZC NO CHARGE NURSE ONLY: CPT | Performed by: PHYSICIAN ASSISTANT

## 2019-04-18 NOTE — Clinical Note
Patient BP still elevated. Please have patient check one more time at pharmacy and if still elevated we'll need to increase medication

## 2019-04-18 NOTE — PROGRESS NOTES
Toby Zuluaga was evaluated at [facility] on April 18, 2019 at which time his blood pressure was:    BP Readings from Last 3 Encounters:   04/18/19 145/84   04/08/19 134/82   02/26/19 152/86     Pulse Readings from Last 3 Encounters:   04/08/19 63   02/26/19 68   01/29/19 60       Reviewed lifestyle modifications for blood pressure control and reduction: including making healthy food choices, managing weight, getting regular exercise, smoking cessation, reducing alcohol consumption, monitoring blood pressure regularly.     Symptoms: None    BP Goal:< 140/90 mmHg    BP Assessment:  BP too high    Potential Reasons for BP too high: Dose of BP medication too low    BP Follow-Up Plan: Referral to PCP    Recommendation to Provider: dose adjustment    Note completed by: Anderson Wilson Pharmacist

## 2019-04-19 ENCOUNTER — DOCUMENTATION ONLY (OUTPATIENT)
Dept: FAMILY MEDICINE | Facility: CLINIC | Age: 69
End: 2019-04-19

## 2019-04-19 NOTE — PROGRESS NOTES
"Called patient and LM to call back, please read note below.     Per provider note - \"Patient BP still elevated. Please have patient check one more time at pharmacy and if still elevated we'll need to increase medication\"  Beatriz Junior MA     "

## 2019-04-20 DIAGNOSIS — I10 ESSENTIAL HYPERTENSION: ICD-10-CM

## 2019-04-21 NOTE — TELEPHONE ENCOUNTER
"Requested Prescriptions   Pending Prescriptions Disp Refills     lisinopril (PRINIVIL/ZESTRIL) 5 MG tablet [Pharmacy Med Name: LISINOPRIL 5MG TABS]  Last Written Prescription Date:  2/26/2019  Last Fill Quantity: 90 tab,  # refills: 0   Last Office Visit: 4/8/2019 john    Future Office Visit:      90 tablet 0     Sig: TAKE ONE TABLET BY MOUTH ONCE DAILY       ACE Inhibitors (Including Combos) Protocol Failed - 4/20/2019  7:43 PM        Failed - Blood pressure under 140/90 in past 12 months     BP Readings from Last 3 Encounters:   04/18/19 145/84   04/08/19 134/82   02/26/19 152/86                 Passed - Recent (12 mo) or future (30 days) visit within the authorizing provider's specialty     Patient had office visit in the last 12 months or has a visit in the next 30 days with authorizing provider or within the authorizing provider's specialty.  See \"Patient Info\" tab in inbasket, or \"Choose Columns\" in Meds & Orders section of the refill encounter.              Passed - Medication is active on med list        Passed - Patient is age 18 or older        Passed - Normal serum creatinine on file in past 12 months     Recent Labs   Lab Test 04/08/19  1109   CR 0.91             Passed - Normal serum potassium on file in past 12 months     Recent Labs   Lab Test 04/08/19  1109   POTASSIUM 4.3             "

## 2019-04-23 RX ORDER — LISINOPRIL 5 MG/1
TABLET ORAL
Qty: 30 TABLET | Refills: 0 | Status: SHIPPED | OUTPATIENT
Start: 2019-04-23 | End: 2019-04-30

## 2019-04-23 NOTE — TELEPHONE ENCOUNTER
Medication is being filled for a one time refill only as patient is due for BP follow up as advised from Last check.  30 day refill given.  Please call and assist with scheduling appointment prior to next refill   Viky KULKARNI RN - Triage  [facility]

## 2019-04-29 ENCOUNTER — ALLIED HEALTH/NURSE VISIT (OUTPATIENT)
Dept: FAMILY MEDICINE | Facility: CLINIC | Age: 69
End: 2019-04-29
Payer: COMMERCIAL

## 2019-04-29 VITALS — SYSTOLIC BLOOD PRESSURE: 142 MMHG | DIASTOLIC BLOOD PRESSURE: 84 MMHG

## 2019-04-29 DIAGNOSIS — I10 ESSENTIAL HYPERTENSION: Primary | ICD-10-CM

## 2019-04-29 PROCEDURE — 99207 ZZC NO CHARGE NURSE ONLY: CPT | Performed by: PHYSICIAN ASSISTANT

## 2019-04-29 NOTE — TELEPHONE ENCOUNTER
Patient's spouse Sara calling to let us know that patient had his bp checked at the [facility] pharmacy today and he tried to refill his bp medication (Lisinopril) and was told it was not approved/available for refill and to contact his provider. Please send refill or call pharmacy so he can pick this up.

## 2019-04-29 NOTE — PROGRESS NOTES
Toby Zuluaga was evaluated at [facility] on April 29, 2019 at which time his blood pressure was:    BP Readings from Last 3 Encounters:   04/29/19 142/84   04/18/19 145/84   04/08/19 134/82     Pulse Readings from Last 3 Encounters:   04/08/19 63   02/26/19 68   01/29/19 60       Reviewed lifestyle modifications for blood pressure control and reduction: including making healthy food choices, managing weight, getting regular exercise, smoking cessation, reducing alcohol consumption, monitoring blood pressure regularly.     Symptoms: None    BP Goal:< 140/90 mmHg    BP Assessment:  BP too high    Potential Reasons for BP too high: NA - Not applicable    BP Follow-Up Plan: Recheck BP in 30 days at pharmacy    Recommendation to Provider: adjust medications    Note completed by: Nica Frost, PharmD  [facility]

## 2019-04-29 NOTE — TELEPHONE ENCOUNTER
Lisinopril was refilled 4/23/19, disp 30 with no refills.      Called the pharmacy and spoke to Cristy.  He has a refill, but it is too early to fill.  She said he last filled 2/26/19, dispense 90.        It sounds like he spoke to Calvin at the pharmacy today.

## 2019-04-29 NOTE — PROGRESS NOTES
Still elevated. Please call Martha and have him double his medication (10mg total). Then recheck at pharmacy after 1 week.    5.83

## 2019-04-30 RX ORDER — LISINOPRIL 20 MG/1
20 TABLET ORAL DAILY
Qty: 90 TABLET | Refills: 0 | Status: SHIPPED | OUTPATIENT
Start: 2019-04-30 | End: 2019-07-07

## 2019-04-30 NOTE — PROGRESS NOTES
My mistake. Patient already taking 10mg (#2, 5mg tabs).  Please let him know I have sent in an even high dose of medication.  It is 20mg dose. Take #1 tab at night and follow up with me in 2 weeks.

## 2019-06-06 ENCOUNTER — ALLIED HEALTH/NURSE VISIT (OUTPATIENT)
Dept: FAMILY MEDICINE | Facility: CLINIC | Age: 69
End: 2019-06-06
Payer: COMMERCIAL

## 2019-06-06 VITALS — SYSTOLIC BLOOD PRESSURE: 146 MMHG | DIASTOLIC BLOOD PRESSURE: 80 MMHG

## 2019-06-06 DIAGNOSIS — Z01.30 BP CHECK: Primary | ICD-10-CM

## 2019-06-06 PROCEDURE — 99207 ZZC NO CHARGE NURSE ONLY: CPT | Performed by: PHYSICIAN ASSISTANT

## 2019-06-06 NOTE — PROGRESS NOTES
Toby Zuluaga was evaluated at [facility] on June 6, 2019 at which time his blood pressure was:    BP Readings from Last 3 Encounters:   06/06/19 146/80   04/29/19 142/84   04/18/19 145/84     Pulse Readings from Last 3 Encounters:   04/08/19 63   02/26/19 68   01/29/19 60       Reviewed lifestyle modifications for blood pressure control and reduction: including making healthy food choices, managing weight, getting regular exercise, smoking cessation, reducing alcohol consumption, monitoring blood pressure regularly.     Symptoms: None    BP Goal:< 140/90 mmHg    BP Assessment:  BP too high    Potential Reasons for BP too high: NA - Not applicable    BP Follow-Up Plan: Recheck BP in 30 days at pharmacy    Recommendation to Provider: follow-up blood pressure check within 30 days    Note completed by: Calvin Ahmadi    Thank You   Santino Verdugo [facility]

## 2019-06-07 ENCOUNTER — DOCUMENTATION ONLY (OUTPATIENT)
Dept: FAMILY MEDICINE | Facility: CLINIC | Age: 69
End: 2019-06-07

## 2019-06-07 DIAGNOSIS — I10 ESSENTIAL HYPERTENSION: ICD-10-CM

## 2019-06-07 NOTE — PROGRESS NOTES
Toby's BP is still uncontrolled. We need to increase once again. If we cannot get it under control with the max dose we will need to change medication. Please have him start taking 2 of his 20mg tablets to make a dose of 40mg. He should follow up bp check at pharmacy one week following. He will need to see me in one month

## 2019-06-07 NOTE — PROGRESS NOTES
Per note from Ajith Greer - called patient and LM to return call to clinic regarding note below.   Toby's BP is still uncontrolled. We need to increase once again. If we cannot get it under control with the max dose we will need to change medication. Please have him start taking 2 of his 20mg tablets to make a dose of 40mg. He should follow up bp check at pharmacy one week following. He will need to see me in one month        Beatriz Junior MA

## 2019-06-07 NOTE — PROGRESS NOTES
Pt called back in- Notified of recommendations- pt verbalized understanding and is agreeable to plan.   Kady KING RN

## 2019-06-14 ENCOUNTER — ALLIED HEALTH/NURSE VISIT (OUTPATIENT)
Dept: FAMILY MEDICINE | Facility: CLINIC | Age: 69
End: 2019-06-14
Payer: COMMERCIAL

## 2019-06-14 VITALS — SYSTOLIC BLOOD PRESSURE: 174 MMHG | DIASTOLIC BLOOD PRESSURE: 94 MMHG

## 2019-06-14 DIAGNOSIS — Z01.30 BP CHECK: Primary | ICD-10-CM

## 2019-06-14 PROCEDURE — 99207 ZZC NO CHARGE NURSE ONLY: CPT | Performed by: PHYSICIAN ASSISTANT

## 2019-06-14 NOTE — PROGRESS NOTES
Toby Zuluaga was evaluated at [facility] on June 14, 2019 at which time his blood pressure was:    BP Readings from Last 3 Encounters:   06/14/19 (!) 174/94   06/06/19 146/80   04/29/19 142/84     Pulse Readings from Last 3 Encounters:   04/08/19 63   02/26/19 68   01/29/19 60       Reviewed lifestyle modifications for blood pressure control and reduction: including making healthy food choices, managing weight, getting regular exercise, smoking cessation, reducing alcohol consumption, monitoring blood pressure regularly.     Symptoms: Dizziness and Lightheadedness (patient states feeling lightheadedness when standing up a few times)    BP Goal:< 140/90 mmHg    BP Assessment:  BP too high     Potential Reasons for BP too high: Unknown/Other: unknown (patient states increasing lisinopril dose to 40 mg daily a week ago)    BP Follow-Up Plan: Referral to PCP    Recommendation to Provider: follow up blood pressure check within 30 days, refer to PCP    Note completed by: Calvin Ahmadi    Thank You   [facility]antino [facility]

## 2019-06-17 NOTE — PROGRESS NOTES
"Subjective     Toby Zuluaga is a 68 year old male who presents to clinic today for the following health issues:    HPI   Hypertension Follow-up      Do you check your blood pressure regularly outside of the clinic? No     Are you following a low salt diet? Yes    Are your blood pressures ever more than 140 on the top number (systolic) OR more   than 90 on the bottom number (diastolic), for example 140/90? Yes    Amount of exercise or physical activity: None    Problems taking medications regularly: No    Medication side effects: none    Diet: regular (no restrictions)      {additonal problems for provider to add (Optional):549890}    {HIST REVIEW/ LINKS 2 (Optional):253712}    {Additional problems for the provider to add (optional):308266}  Reviewed and updated as needed this visit by Provider         Review of Systems   {ROS COMP (Optional):870773}      Objective    There were no vitals taken for this visit.  There is no height or weight on file to calculate BMI.  Physical Exam   {Exam List (Optional):749849}    {Diagnostic Test Results (Optional):360787::\"Diagnostic Test Results:\",\"Labs reviewed in Epic\"}        {PROVIDER CHARTING PREFERENCE:132905}      "

## 2019-06-18 ENCOUNTER — OFFICE VISIT (OUTPATIENT)
Dept: FAMILY MEDICINE | Facility: CLINIC | Age: 69
End: 2019-06-18
Payer: COMMERCIAL

## 2019-06-18 VITALS
WEIGHT: 312.1 LBS | OXYGEN SATURATION: 97 % | BODY MASS INDEX: 44.68 KG/M2 | SYSTOLIC BLOOD PRESSURE: 160 MMHG | HEART RATE: 53 BPM | DIASTOLIC BLOOD PRESSURE: 100 MMHG | RESPIRATION RATE: 18 BRPM | TEMPERATURE: 97.9 F | HEIGHT: 70 IN

## 2019-06-18 DIAGNOSIS — I10 ESSENTIAL HYPERTENSION: Primary | ICD-10-CM

## 2019-06-18 PROCEDURE — 99214 OFFICE O/P EST MOD 30 MIN: CPT | Performed by: PHYSICIAN ASSISTANT

## 2019-06-18 RX ORDER — HYDROCHLOROTHIAZIDE 12.5 MG/1
12.5 TABLET ORAL DAILY
Qty: 90 TABLET | Refills: 0 | Status: SHIPPED | OUTPATIENT
Start: 2019-06-18 | End: 2019-10-06

## 2019-06-18 ASSESSMENT — MIFFLIN-ST. JEOR: SCORE: 2190.9

## 2019-06-18 NOTE — PROGRESS NOTES
Subjective     Toby Zuluaga is a 68 year old male who presents to clinic today for the following health issues:    HPI   Hypertension Follow-up    Do you check your blood pressure regularly outside of the clinic? Yes     Are you following a low salt diet? Yes    Are your blood pressures ever more than 140 on the top number (systolic) OR more   than 90 on the bottom number (diastolic), for example 140/90? Yes    Amount of exercise or physical activity: None    Problems taking medications regularly: No    Medication side effects: none    Diet: regular (no restrictions)    -Toby presents to follow up on persistent HTN  -we have steadily increased his lisinopril dose and he is maxed now  -he has tolerated this well   -He has started walking more  -has lost weight (a few pounds)  -denies any chest pain or shortness of breath  -he has been checking at home; still high at home but better  -wears his CPAP      Patient Active Problem List   Diagnosis     JAIME (obstructive sleep apnea)     CARDIOVASCULAR SCREENING; LDL GOAL LESS THAN 160     Morbid obesity (H)     HTN (hypertension)     Past Surgical History:   Procedure Laterality Date     ARTHROSCOPY KNEE BILATERAL      3 left, 2 right     AS YAG LASER CAPSULOTOMY Bilateral      REPAIR TENDON ANKLE Left        Social History     Tobacco Use     Smoking status: Never Smoker     Smokeless tobacco: Never Used   Substance Use Topics     Alcohol use: Yes     Comment: occ     Family History   Problem Relation Age of Onset     Chronic Obstructive Pulmonary Disease Mother      Alzheimer Disease Father      Diabetes No family hx of      Hypertension No family hx of            Reviewed and updated as needed this visit by Provider         Review of Systems   ROS COMP: Constitutional, HEENT, cardiovascular, pulmonary, gi and gu systems are negative, except as otherwise noted.      Objective    BP (!) 162/98   Pulse 53   Temp 97.9  F (36.6  C) (Tympanic)   Resp 18   Ht 1.784 m  "(5' 10.25\")   Wt 141.6 kg (312 lb 1.6 oz)   SpO2 97%   BMI 44.46 kg/m    Body mass index is 44.46 kg/m .  Physical Exam   GENERAL: healthy, alert and no distress  RESP: lungs clear to auscultation - no rales, rhonchi or wheezes  CV: regular rate and rhythm, normal S1 S2, no S3 or S4, no murmur, click or rub, no peripheral edema and peripheral pulses strong  MS: No peripheral edema     Diagnostic Test Results:  Labs reviewed in Epic        Assessment & Plan     1. Essential hypertension  Poorly controlled. In fact he seems to be getting worse readings the more elevated we go on the lisinopril. He is tolerating the meds despite this increase. I think we need to add meds. We reviewed his options. BB would not be recommended given his HR. Consider CCB. He has no urinary complaints currently and is interested in remaining on a single pill. We'll first try adding diuretic separate to help with dose adjustment. Start with use of lisinopril 20 and hydrochlorothiazide 12.5. With continued pharmacy BP monitoring plan to steadily increase - up to 20-25 twice daily if needed. Follow up bmp one month, clinic visit 3 months  - hydrochlorothiazide (HYDRODIURIL) 12.5 MG tablet; Take 1 tablet (12.5 mg) by mouth daily  Dispense: 90 tablet; Refill: 0     BMI:   Estimated body mass index is 44.46 kg/m  as calculated from the following:    Height as of this encounter: 1.784 m (5' 10.25\").    Weight as of this encounter: 141.6 kg (312 lb 1.6 oz).   Weight management plan: Discussed healthy diet and exercise guidelines      Return in about 3 months (around 9/18/2019) for BP Recheck; 1 month lab check.    Rodri Greer PA-C  [facility]      "

## 2019-07-07 DIAGNOSIS — I10 ESSENTIAL HYPERTENSION: ICD-10-CM

## 2019-07-08 ENCOUNTER — DOCUMENTATION ONLY (OUTPATIENT)
Dept: LAB | Facility: CLINIC | Age: 69
End: 2019-07-08

## 2019-07-08 DIAGNOSIS — I10 ESSENTIAL HYPERTENSION: Primary | ICD-10-CM

## 2019-07-08 RX ORDER — LISINOPRIL 20 MG/1
TABLET ORAL
Qty: 90 TABLET | Refills: 0 | Status: SHIPPED | OUTPATIENT
Start: 2019-07-08 | End: 2019-10-06

## 2019-07-08 NOTE — TELEPHONE ENCOUNTER
Prescription approved per [facility] Protocol.  Lynne Ortiz RN

## 2019-07-08 NOTE — PROGRESS NOTES
Toby has an upcoming lab only appointment, 7/16/19.  Please place future lab orders and sign them.  Thanks, [facility]

## 2019-07-10 ENCOUNTER — ALLIED HEALTH/NURSE VISIT (OUTPATIENT)
Dept: NURSING | Facility: CLINIC | Age: 69
End: 2019-07-10
Payer: COMMERCIAL

## 2019-07-10 DIAGNOSIS — Z23 NEED FOR VACCINATION: Primary | ICD-10-CM

## 2019-07-10 PROCEDURE — 90471 IMMUNIZATION ADMIN: CPT

## 2019-07-10 PROCEDURE — 90732 PPSV23 VACC 2 YRS+ SUBQ/IM: CPT

## 2019-07-10 PROCEDURE — 99207 ZZC NO CHARGE NURSE ONLY: CPT

## 2019-07-16 DIAGNOSIS — I10 ESSENTIAL HYPERTENSION: ICD-10-CM

## 2019-07-16 LAB
ANION GAP SERPL CALCULATED.3IONS-SCNC: 4 MMOL/L (ref 3–14)
BUN SERPL-MCNC: 20 MG/DL (ref 7–30)
CALCIUM SERPL-MCNC: 8 MG/DL (ref 8.5–10.1)
CHLORIDE SERPL-SCNC: 108 MMOL/L (ref 94–109)
CO2 SERPL-SCNC: 29 MMOL/L (ref 20–32)
CREAT SERPL-MCNC: 1.08 MG/DL (ref 0.66–1.25)
GFR SERPL CREATININE-BSD FRML MDRD: 69 ML/MIN/{1.73_M2}
GLUCOSE SERPL-MCNC: 101 MG/DL (ref 70–99)
POTASSIUM SERPL-SCNC: 3.9 MMOL/L (ref 3.4–5.3)
SODIUM SERPL-SCNC: 141 MMOL/L (ref 133–144)

## 2019-07-16 PROCEDURE — 80048 BASIC METABOLIC PNL TOTAL CA: CPT | Performed by: PHYSICIAN ASSISTANT

## 2019-07-16 PROCEDURE — 36415 COLL VENOUS BLD VENIPUNCTURE: CPT | Performed by: PHYSICIAN ASSISTANT

## 2019-09-04 ENCOUNTER — ALLIED HEALTH/NURSE VISIT (OUTPATIENT)
Dept: FAMILY MEDICINE | Facility: CLINIC | Age: 69
End: 2019-09-04
Payer: COMMERCIAL

## 2019-09-04 VITALS — DIASTOLIC BLOOD PRESSURE: 82 MMHG | SYSTOLIC BLOOD PRESSURE: 150 MMHG

## 2019-09-04 DIAGNOSIS — I10 ESSENTIAL HYPERTENSION: ICD-10-CM

## 2019-09-04 DIAGNOSIS — I10 ESSENTIAL HYPERTENSION: Primary | ICD-10-CM

## 2019-09-04 PROCEDURE — 99207 ZZC NO CHARGE NURSE ONLY: CPT | Performed by: PHYSICIAN ASSISTANT

## 2019-09-04 NOTE — PROGRESS NOTES
Toby Zuluaga was evaluated at [facility] on September 4, 2019 at which time his blood pressure was:    BP Readings from Last 3 Encounters:   09/04/19 (!) 150/82   06/18/19 (!) 160/100   06/14/19 (!) 174/94     Pulse Readings from Last 3 Encounters:   06/18/19 53   04/08/19 63   02/26/19 68       Reviewed lifestyle modifications for blood pressure control and reduction: including making healthy food choices, managing weight, getting regular exercise, smoking cessation, reducing alcohol consumption, monitoring blood pressure regularly.     Symptoms: None    BP Goal:< 140/90 mmHg    BP Assessment:  BP too high    Potential Reasons for BP too high: Dose of BP medication too low    BP Follow-Up Plan: Recheck BP in 30 days at pharmacy    Recommendation to Provider: Look at increasing BP medication    Note completed by: Alessia

## 2019-09-04 NOTE — Clinical Note
Toby PERSON Zan was evaluated in a specialty clinic today at which time his blood pressure was noted to be elevated.  He  has been advised to follow up with his primary provider or with a nurse only visit. We are also routing this message to his primary provider as an FYI.

## 2019-09-05 RX ORDER — LISINOPRIL AND HYDROCHLOROTHIAZIDE 12.5; 2 MG/1; MG/1
1 TABLET ORAL 2 TIMES DAILY
Qty: 180 TABLET | Refills: 0 | Status: SHIPPED | OUTPATIENT
Start: 2019-09-05 | End: 2019-11-01

## 2019-09-05 RX ORDER — HYDROCHLOROTHIAZIDE 12.5 MG/1
TABLET ORAL
Qty: 90 TABLET | Refills: 0 | OUTPATIENT
Start: 2019-09-05

## 2019-09-05 NOTE — TELEPHONE ENCOUNTER
"Routing refill request to provider for review/approval because:  Labs out of range:  bp   pt had nurse visit for bp check 9/4            Requested Prescriptions   Pending Prescriptions Disp Refills     hydrochlorothiazide (HYDRODIURIL) 12.5 MG tablet [Pharmacy Med Name: HYDROCHLOROTHIAZIDE 12.5MG TABS] 90 tablet 0     Sig: TAKE ONE TABLET BY MOUTH ONCE DAILY       Diuretics (Including Combos) Protocol Failed - 9/4/2019  9:02 AM        Failed - Blood pressure under 140/90 in past 12 months     BP Readings from Last 3 Encounters:   09/04/19 (!) 150/82   06/18/19 (!) 160/100   06/14/19 (!) 174/94                 Passed - Recent (12 mo) or future (30 days) visit within the authorizing provider's specialty     Patient had office visit in the last 12 months or has a visit in the next 30 days with authorizing provider or within the authorizing provider's specialty.  See \"Patient Info\" tab in inbasket, or \"Choose Columns\" in Meds & Orders section of the refill encounter.              Passed - Medication is active on med list        Passed - Patient is age 18 or older        Passed - Normal serum creatinine on file in past 12 months     Recent Labs   Lab Test 07/16/19  0751   CR 1.08              Passed - Normal serum potassium on file in past 12 months     Recent Labs   Lab Test 07/16/19  0751   POTASSIUM 3.9                    Passed - Normal serum sodium on file in past 12 months     Recent Labs   Lab Test 07/16/19  0751                   "

## 2019-09-05 NOTE — TELEPHONE ENCOUNTER
Please let Toby know sheila sent in a new Rx this time with both medications in one pill. I'd like him to take one in am and one in pm. Recheck at the pharmacy as usual

## 2019-10-02 ENCOUNTER — ALLIED HEALTH/NURSE VISIT (OUTPATIENT)
Dept: FAMILY MEDICINE | Facility: CLINIC | Age: 69
End: 2019-10-02
Payer: COMMERCIAL

## 2019-10-02 VITALS — DIASTOLIC BLOOD PRESSURE: 92 MMHG | SYSTOLIC BLOOD PRESSURE: 154 MMHG

## 2019-10-02 DIAGNOSIS — Z01.30 BP CHECK: Primary | ICD-10-CM

## 2019-10-02 PROCEDURE — 99207 ZZC NO CHARGE NURSE ONLY: CPT | Performed by: PHYSICIAN ASSISTANT

## 2019-10-02 NOTE — PROGRESS NOTES
Toby Zuluaga was evaluated at [facility] on October 2, 2019 at which time his blood pressure was:    BP Readings from Last 3 Encounters:   10/02/19 (!) 154/92   09/04/19 (!) 150/82   06/18/19 (!) 160/100     Pulse Readings from Last 3 Encounters:   06/18/19 53   04/08/19 63   02/26/19 68       Reviewed lifestyle modifications for blood pressure control and reduction: including making healthy food choices, managing weight, getting regular exercise, smoking cessation, reducing alcohol consumption, monitoring blood pressure regularly.     Symptoms: None    BP Goal:< 140/90 mmHg    BP Assessment:  BP too high    Potential Reasons for BP too high: NA - Not applicable    BP Follow-Up Plan: Recheck BP in 30 days at pharmacy    Recommendation to Provider: follow up blood pressure check within 30 days    Note completed by: Calvin Ahmadi    Thank You   [facility]

## 2019-10-07 ENCOUNTER — TELEPHONE (OUTPATIENT)
Dept: FAMILY MEDICINE | Facility: CLINIC | Age: 69
End: 2019-10-07

## 2019-10-07 NOTE — TELEPHONE ENCOUNTER
LM for patient to call back to schedule. Patient needs F/U appointment with PCP for blood pressure and needs to also come fasting. Please help patient schedule.  -Destinee Burt

## 2019-10-07 NOTE — PROGRESS NOTES
Please call Toby. BP continues to remain quite elevated despite med increases. I'd like to see him in clinic to discuss increasing dose, get blood work. I do recommend he be fasting. He has not had cholesterol checked in a while either

## 2019-11-01 ENCOUNTER — OFFICE VISIT (OUTPATIENT)
Dept: FAMILY MEDICINE | Facility: CLINIC | Age: 69
End: 2019-11-01
Payer: COMMERCIAL

## 2019-11-01 VITALS
SYSTOLIC BLOOD PRESSURE: 138 MMHG | DIASTOLIC BLOOD PRESSURE: 84 MMHG | TEMPERATURE: 97.7 F | WEIGHT: 315 LBS | BODY MASS INDEX: 45.1 KG/M2 | HEART RATE: 72 BPM | HEIGHT: 70 IN | RESPIRATION RATE: 16 BRPM | OXYGEN SATURATION: 95 %

## 2019-11-01 DIAGNOSIS — I10 ESSENTIAL HYPERTENSION: Primary | ICD-10-CM

## 2019-11-01 LAB
ALBUMIN SERPL-MCNC: 3.7 G/DL (ref 3.4–5)
ALP SERPL-CCNC: 89 U/L (ref 40–150)
ALT SERPL W P-5'-P-CCNC: 24 U/L (ref 0–70)
ANION GAP SERPL CALCULATED.3IONS-SCNC: 4 MMOL/L (ref 3–14)
AST SERPL W P-5'-P-CCNC: 20 U/L (ref 0–45)
BILIRUB SERPL-MCNC: 0.6 MG/DL (ref 0.2–1.3)
BUN SERPL-MCNC: 17 MG/DL (ref 7–30)
CALCIUM SERPL-MCNC: 8.1 MG/DL (ref 8.5–10.1)
CHLORIDE SERPL-SCNC: 106 MMOL/L (ref 94–109)
CO2 SERPL-SCNC: 29 MMOL/L (ref 20–32)
CREAT SERPL-MCNC: 1.02 MG/DL (ref 0.66–1.25)
GFR SERPL CREATININE-BSD FRML MDRD: 74 ML/MIN/{1.73_M2}
GLUCOSE SERPL-MCNC: 112 MG/DL (ref 70–99)
POTASSIUM SERPL-SCNC: 3.7 MMOL/L (ref 3.4–5.3)
PROT SERPL-MCNC: 6.8 G/DL (ref 6.8–8.8)
SODIUM SERPL-SCNC: 139 MMOL/L (ref 133–144)

## 2019-11-01 PROCEDURE — 99214 OFFICE O/P EST MOD 30 MIN: CPT | Performed by: PHYSICIAN ASSISTANT

## 2019-11-01 PROCEDURE — 80053 COMPREHEN METABOLIC PANEL: CPT | Performed by: PHYSICIAN ASSISTANT

## 2019-11-01 PROCEDURE — 82043 UR ALBUMIN QUANTITATIVE: CPT | Performed by: PHYSICIAN ASSISTANT

## 2019-11-01 PROCEDURE — 36415 COLL VENOUS BLD VENIPUNCTURE: CPT | Performed by: PHYSICIAN ASSISTANT

## 2019-11-01 RX ORDER — PHENOL 1.4 %
1 AEROSOL, SPRAY (ML) MUCOUS MEMBRANE DAILY
COMMUNITY

## 2019-11-01 RX ORDER — LISINOPRIL AND HYDROCHLOROTHIAZIDE 12.5; 2 MG/1; MG/1
1 TABLET ORAL 2 TIMES DAILY
Qty: 180 TABLET | Refills: 0 | Status: SHIPPED | OUTPATIENT
Start: 2019-11-01 | End: 2020-01-21

## 2019-11-01 RX ORDER — AMLODIPINE BESYLATE 5 MG/1
5 TABLET ORAL DAILY
Qty: 90 TABLET | Refills: 0 | Status: SHIPPED | OUTPATIENT
Start: 2019-11-01 | End: 2020-01-21

## 2019-11-01 ASSESSMENT — MIFFLIN-ST. JEOR: SCORE: 2222.3

## 2019-11-01 NOTE — PROGRESS NOTES
Subjective     Toby Zuluaga is a 69 year old male who presents to clinic today for the following health issues:    HPI   Hypertension Follow-up    Do you check your blood pressure regularly outside of the clinic? No , but does check them sometimes    Are you following a low salt diet? Yes; never adds salt    Are your blood pressures ever more than 140 on the top number (systolic) OR more   than 90 on the bottom number (diastolic), for example 140/90? Yes    How many servings of fruits and vegetables do you eat daily?  2-3    On average, how many sweetened beverages do you drink each day (soda, juice, sweet tea, etc)?   0-1    How many days per week do you miss taking your medication? 0    Toby Zuluaga is a 69 year old male who presents today for hypertension check up  He remains active but nothing strenuous,  No chest pain or HA  Takes a few cups coffee in the morning  No smoking  Home made meals, rarely adding salt   We have steadily increased his dose to 20-12.5 twice daily   He tolerates this well  Checks home cuff occasionally; can be quite variable without rare good control      Patient Active Problem List   Diagnosis     JAIME (obstructive sleep apnea)     CARDIOVASCULAR SCREENING; LDL GOAL LESS THAN 160     Morbid obesity (H)     HTN (hypertension)     Past Surgical History:   Procedure Laterality Date     ARTHROSCOPY KNEE BILATERAL      3 left, 2 right     AS YAG LASER CAPSULOTOMY Bilateral      REPAIR TENDON ANKLE Left        Social History     Tobacco Use     Smoking status: Never Smoker     Smokeless tobacco: Never Used   Substance Use Topics     Alcohol use: Yes     Comment: occ     Family History   Problem Relation Age of Onset     Chronic Obstructive Pulmonary Disease Mother      Alzheimer Disease Father      Diabetes No family hx of      Hypertension No family hx of            Reviewed and updated as needed this visit by Provider         Review of Systems   ROS COMP: Constitutional, HEENT,  "cardiovascular, pulmonary, gi and gu systems are negative, except as otherwise noted.      Objective    /84   Pulse 72   Temp 97.7  F (36.5  C) (Oral)   Resp 16   Ht 1.778 m (5' 10\")   Wt 145.6 kg (321 lb)   SpO2 95%   BMI 46.06 kg/m    Body mass index is 46.06 kg/m .  Physical Exam   GENERAL: healthy, alert and no distress  EYES: Eyes grossly normal to inspection, PERRL and conjunctivae and sclerae normal  RESP: lungs clear to auscultation - no rales, rhonchi or wheezes  CV: regular rates and rhythm, normal S1 S2, no S3 or S4 and no murmur, click or rub  MS: No peripheral edema   NEURO: mentation intact    Diagnostic Test Results:  Updating today        Assessment & Plan     1. Essential hypertension  Continues up and down. Still not at goal a majority of time though did come down on recheck today. He needs to add medication (below) and get working on improved lifestyle changes. We'll follow up in 3 months, sooner with repeat checks at pharmacy  - lisinopril-hydrochlorothiazide (PRINZIDE/ZESTORETIC) 20-12.5 MG tablet; Take 1 tablet by mouth 2 times daily  Dispense: 180 tablet; Refill: 0  - Comprehensive metabolic panel  - Albumin Random Urine Quantitative with Creat Ratio  - amLODIPine (NORVASC) 5 MG tablet; Take 1 tablet (5 mg) by mouth daily  Dispense: 90 tablet; Refill: 0     BMI:   Estimated body mass index is 46.06 kg/m  as calculated from the following:    Height as of this encounter: 1.778 m (5' 10\").    Weight as of this encounter: 145.6 kg (321 lb).   Weight management plan: Discussed healthy diet and exercise guidelines            Return in about 3 months (around 2/1/2020) for Physical Exam.    Rodri Greer PA-C  [facility]    "

## 2019-11-02 LAB
CREAT UR-MCNC: 103 MG/DL
MICROALBUMIN UR-MCNC: 5 MG/L
MICROALBUMIN/CREAT UR: 5.2 MG/G CR (ref 0–17)

## 2019-12-05 ENCOUNTER — ALLIED HEALTH/NURSE VISIT (OUTPATIENT)
Dept: FAMILY MEDICINE | Facility: CLINIC | Age: 69
End: 2019-12-05
Payer: COMMERCIAL

## 2019-12-05 VITALS — SYSTOLIC BLOOD PRESSURE: 134 MMHG | DIASTOLIC BLOOD PRESSURE: 76 MMHG

## 2019-12-05 DIAGNOSIS — Z01.30 BP CHECK: Primary | ICD-10-CM

## 2019-12-05 PROCEDURE — 99207 ZZC NO CHARGE NURSE ONLY: CPT | Performed by: PHYSICIAN ASSISTANT

## 2019-12-05 NOTE — PROGRESS NOTES
Toby Zuluaga was evaluated at [facility] on December 5, 2019 at which time his blood pressure was:    BP Readings from Last 3 Encounters:   12/05/19 134/76   11/01/19 138/84   10/02/19 (!) 154/92     Pulse Readings from Last 3 Encounters:   11/01/19 72   06/18/19 53   04/08/19 63       Reviewed lifestyle modifications for blood pressure control and reduction: including making healthy food choices, managing weight, getting regular exercise, smoking cessation, reducing alcohol consumption, monitoring blood pressure regularly.     Symptoms: None    BP Goal:< 140/90 mmHg    BP Assessment:  BP at goal    Potential Reasons for BP too high: NA - Not applicable    BP Follow-Up Plan: Recheck BP in 6 months at pharmacy    Recommendation to Provider: none    Note completed by: Calvin Ahmadi    Thank You   Santino Verdugo [facility]

## 2020-01-20 DIAGNOSIS — I10 ESSENTIAL HYPERTENSION: ICD-10-CM

## 2020-01-20 NOTE — TELEPHONE ENCOUNTER
"Requested Prescriptions   Pending Prescriptions Disp Refills     lisinopril-hydrochlorothiazide (PRINZIDE/ZESTORETIC) 20-12.5 MG tablet [Pharmacy Med Name: Lisinopril-hydroCHLOROthiazide 20-12.5 MG Oral Tablet]  0     Sig: TAKE 1 TABLET BY MOUTH TWICE DAILY   Last Written Prescription Date:  11/1/19  Last Fill Quantity: 180,  # refills: 0   Last office visit: 11/1/2019 with prescribing provider:  Rodri Greer PA-C    Future Office Visit:        Diuretics (Including Combos) Protocol Passed - 1/20/2020  7:28 AM        Passed - Blood pressure under 140/90 in past 12 months     BP Readings from Last 3 Encounters:   12/05/19 134/76   11/01/19 138/84   10/02/19 (!) 154/92                 Passed - Recent (12 mo) or future (30 days) visit within the authorizing provider's specialty     Patient has had an office visit with the authorizing provider or a provider within the authorizing providers department within the previous 12 mos or has a future within next 30 days. See \"Patient Info\" tab in inbasket, or \"Choose Columns\" in Meds & Orders section of the refill encounter.              Passed - Medication is active on med list        Passed - Patient is age 18 or older        Passed - Normal serum creatinine on file in past 12 months     Recent Labs   Lab Test 11/01/19  1355   CR 1.02              Passed - Normal serum potassium on file in past 12 months     Recent Labs   Lab Test 11/01/19  1355   POTASSIUM 3.7                    Passed - Normal serum sodium on file in past 12 months     Recent Labs   Lab Test 11/01/19  1355                 amLODIPine (NORVASC) 5 MG tablet [Pharmacy Med Name: amLODIPine Besylate 5 MG Oral Tablet]  0     Sig: TAKE 1 TABLET BY MOUTH ONCE DAILY   Last Written Prescription Date:  11/1/19  Last Fill Quantity: 90,  # refills: 0   Last office visit: 11/1/2019 with prescribing provider:  Rodri Greer PA-C    Future Office Visit:        Calcium Channel Blockers Protocol  " "Passed - 1/20/2020  7:28 AM        Passed - Blood pressure under 140/90 in past 12 months     BP Readings from Last 3 Encounters:   12/05/19 134/76   11/01/19 138/84   10/02/19 (!) 154/92                 Passed - Recent (12 mo) or future (30 days) visit within the authorizing provider's specialty     Patient has had an office visit with the authorizing provider or a provider within the authorizing providers department within the previous 12 mos or has a future within next 30 days. See \"Patient Info\" tab in inbasket, or \"Choose Columns\" in Meds & Orders section of the refill encounter.              Passed - Medication is active on med list        Passed - Patient is age 18 or older        Passed - Normal serum creatinine on file in past 12 months     Recent Labs   Lab Test 11/01/19  1355   CR 1.02              "

## 2020-01-21 RX ORDER — AMLODIPINE BESYLATE 5 MG/1
TABLET ORAL
Qty: 90 TABLET | Refills: 0 | Status: SHIPPED | OUTPATIENT
Start: 2020-01-21 | End: 2020-06-02

## 2020-01-21 RX ORDER — LISINOPRIL AND HYDROCHLOROTHIAZIDE 12.5; 2 MG/1; MG/1
TABLET ORAL
Qty: 180 TABLET | Refills: 0 | Status: SHIPPED | OUTPATIENT
Start: 2020-01-21 | End: 2020-04-03

## 2020-04-03 DIAGNOSIS — I10 ESSENTIAL HYPERTENSION: ICD-10-CM

## 2020-04-03 RX ORDER — LISINOPRIL AND HYDROCHLOROTHIAZIDE 12.5; 2 MG/1; MG/1
TABLET ORAL
Qty: 180 TABLET | Refills: 0 | Status: SHIPPED | OUTPATIENT
Start: 2020-04-03 | End: 2020-05-28

## 2020-04-03 NOTE — TELEPHONE ENCOUNTER
"Requested Prescriptions   Signed Prescriptions Disp Refills    lisinopril-hydrochlorothiazide (ZESTORETIC) 20-12.5 MG tablet 180 tablet 0     Sig: Take 1 tablet by mouth twice daily       Diuretics (Including Combos) Protocol Passed - 4/3/2020 10:49 AM        Passed - Blood pressure under 140/90 in past 12 months     BP Readings from Last 3 Encounters:   12/05/19 134/76   11/01/19 138/84   10/02/19 (!) 154/92                 Passed - Recent (12 mo) or future (30 days) visit within the authorizing provider's specialty     Patient has had an office visit with the authorizing provider or a provider within the authorizing providers department within the previous 12 mos or has a future within next 30 days. See \"Patient Info\" tab in inbasket, or \"Choose Columns\" in Meds & Orders section of the refill encounter.              Passed - Medication is active on med list        Passed - Patient is age 18 or older        Passed - Normal serum creatinine on file in past 12 months     Recent Labs   Lab Test 11/01/19  1355   CR 1.02              Passed - Normal serum potassium on file in past 12 months     Recent Labs   Lab Test 11/01/19  1355   POTASSIUM 3.7                    Passed - Normal serum sodium on file in past 12 months     Recent Labs   Lab Test 11/01/19  1355                ACE Inhibitors (Including Combos) Protocol Passed - 4/3/2020 10:49 AM        Passed - Blood pressure under 140/90 in past 12 months     BP Readings from Last 3 Encounters:   12/05/19 134/76   11/01/19 138/84   10/02/19 (!) 154/92                 Passed - Recent (12 mo) or future (30 days) visit within the authorizing provider's specialty     Patient has had an office visit with the authorizing provider or a provider within the authorizing providers department within the previous 12 mos or has a future within next 30 days. See \"Patient Info\" tab in inbasket, or \"Choose Columns\" in Meds & Orders section of the refill encounter.              " Passed - Medication is active on med list        Passed - Patient is age 18 or older        Passed - Normal serum creatinine on file in past 12 months     Recent Labs   Lab Test 11/01/19  1355   CR 1.02       Ok to refill medication if creatinine is low          Passed - Normal serum potassium on file in past 12 months     Recent Labs   Lab Test 11/01/19  1355   POTASSIUM 3.7

## 2020-04-06 DIAGNOSIS — I10 ESSENTIAL HYPERTENSION: ICD-10-CM

## 2020-04-06 NOTE — TELEPHONE ENCOUNTER
Pharmacy: We didn't receive the new Rx, thanks. (4/5/20)      Disp  Refills  Start  End  SOSA     lisinopril-hydrochlorothiazide (ZESTORETIC) 20-12.5 MG tablet  180 tablet  0  4/3/2020   No    Sig: Take 1 tablet by mouth twice daily

## 2020-04-08 RX ORDER — LISINOPRIL AND HYDROCHLOROTHIAZIDE 12.5; 2 MG/1; MG/1
1 TABLET ORAL 2 TIMES DAILY
Qty: 180 TABLET | Refills: 0 | OUTPATIENT
Start: 2020-04-08

## 2020-05-27 DIAGNOSIS — I10 ESSENTIAL HYPERTENSION: ICD-10-CM

## 2020-05-28 RX ORDER — LISINOPRIL AND HYDROCHLOROTHIAZIDE 12.5; 2 MG/1; MG/1
TABLET ORAL
Qty: 180 TABLET | Refills: 1 | Status: SHIPPED | OUTPATIENT
Start: 2020-05-28 | End: 2021-01-22

## 2020-05-28 NOTE — TELEPHONE ENCOUNTER
Prescription approved per [facility] Refill Protocol.  Kaylie Rey RN

## 2020-06-15 NOTE — PROGRESS NOTES
Refill authorized per protocol.     The pt called back. He is aware and has no further questions at this time.  Fanta Manzano on 4/19/2019 at 9:39 AM

## 2020-11-22 DIAGNOSIS — I10 ESSENTIAL HYPERTENSION: ICD-10-CM

## 2020-11-22 NOTE — LETTER
November 27, 2020      Toby PERSON Zan  [address] [address] [address]        Dear Mr. Toby Zuluaga,    We are contacting you today to notify you that you are due for a medication follow up/physical for further refills for your Amlodipine.    This appointment can be in clinic or virtual by either telephone, video.    Please call [phone] to schedule an appointment.     [facility]      Sincerely,     Rodri Greer PA-C

## 2020-11-23 RX ORDER — AMLODIPINE BESYLATE 5 MG/1
TABLET ORAL
Qty: 90 TABLET | Refills: 0 | Status: SHIPPED | OUTPATIENT
Start: 2020-11-23 | End: 2021-01-22

## 2020-11-23 NOTE — TELEPHONE ENCOUNTER
Patient does need in clinic appt but if he prefers can simply get a BP check with nurse or pharmacy at this point and then make an appt in 3 months. I did refill for 3 months today. Up to him

## 2020-11-23 NOTE — TELEPHONE ENCOUNTER
Routing refill request to provider for review/approval because:  Failed protocol for amlodipine - due for an appt and creatinine    Will forward to the station, please try to help the pt schedule an appt for a physical / med check.  Thanks!

## 2020-11-24 NOTE — TELEPHONE ENCOUNTER
1st attempt - Attempted to call, no answer and mailbox is full. Patient is not signed up for MyChart. Will attempt to call again tomorrow.  -Destinee Burt

## 2021-01-22 ENCOUNTER — VIRTUAL VISIT (OUTPATIENT)
Dept: FAMILY MEDICINE | Facility: CLINIC | Age: 71
End: 2021-01-22
Payer: COMMERCIAL

## 2021-01-22 DIAGNOSIS — I10 ESSENTIAL HYPERTENSION: ICD-10-CM

## 2021-01-22 DIAGNOSIS — Z13.220 SCREENING FOR HYPERLIPIDEMIA: ICD-10-CM

## 2021-01-22 PROCEDURE — 99213 OFFICE O/P EST LOW 20 MIN: CPT | Mod: 95 | Performed by: PHYSICIAN ASSISTANT

## 2021-01-22 RX ORDER — LISINOPRIL AND HYDROCHLOROTHIAZIDE 12.5; 2 MG/1; MG/1
1 TABLET ORAL 2 TIMES DAILY
Qty: 180 TABLET | Refills: 0 | Status: SHIPPED | OUTPATIENT
Start: 2021-01-22 | End: 2021-05-27

## 2021-01-22 RX ORDER — AMLODIPINE BESYLATE 5 MG/1
5 TABLET ORAL DAILY
Qty: 90 TABLET | Refills: 0 | Status: SHIPPED | OUTPATIENT
Start: 2021-01-22 | End: 2021-05-27

## 2021-01-22 RX ORDER — TIMOLOL MALEATE 5 MG/ML
SOLUTION/ DROPS OPHTHALMIC
COMMUNITY
Start: 2020-10-13

## 2021-01-22 NOTE — PROGRESS NOTES
Toby is a 70 year old who is being evaluated via a billable telephone visit.      What phone number would you like to be contacted at? [phone]  How would you like to obtain your AVS? Mail a copy  Assessment & Plan     Essential hypertension  He reports tolerating the new medication (from 11/19) but has had limited BP checks. He feels well. Schedulee labs and BP nurse check in next few weeks and then with me in mid to late april  - amLODIPine (NORVASC) 5 MG tablet; Take 1 tablet (5 mg) by mouth daily  - lisinopril-hydrochlorothiazide (ZESTORETIC) 20-12.5 MG tablet; Take 1 tablet by mouth 2 times daily  Screening for hyperlipidemia  We'll get this with his blood work   - Lipid panel reflex to direct LDL Fasting; Future  }       Return in about 3 months (around 4/22/2021).    Rodri Greer PA-C  [facility]    Andi Luis is a 70 year old who presents to clinic today for the following health issues     HPI       Hypertension Follow-up      Do you check your blood pressure regularly outside of the clinic? Yes     Are you following a low salt diet? Yes    Are your blood pressures ever more than 140 on the top number (systolic) OR more   than 90 on the bottom number (diastolic), for example 140/90? No      How many servings of fruits and vegetables do you eat daily?  2-3    On average, how many sweetened beverages do you drink each day (Examples: soda, juice, sweet tea, etc.  Do NOT count diet or artificially sweetened beverages)?   1    How many days per week do you exercise enough to make your heart beat faster? 3 or less    How many minutes a day do you exercise enough to make your heart beat faster? 9 or less    How many days per week do you miss taking your medication? 0    Toby Zuluaga is a 70 year old male who presents today for BP check  I last saw Toby in 11/19  We had added a medication for BP - amlodipine  He does not check BP at home; too difficult to use  his own cuff  He denies any chest pain, shortness of breath or HA over the past year  Wearing his CPAP          Review of Systems   Constitutional, HEENT, cardiovascular, pulmonary, gi and gu systems are negative, except as otherwise noted.      Objective           Vitals:  No vitals were obtained today due to virtual visit.    Physical Exam   healthy, alert and no distress  PSYCH: Alert and oriented times 3; coherent speech, normal   rate and volume, able to articulate logical thoughts, able   to abstract reason, no tangential thoughts, no hallucinations   or delusions  His affect is normal  RESP: No cough, no audible wheezing, able to talk in full sentences  Remainder of exam unable to be completed due to telephone visits          Phone call duration: 9 minutes

## 2021-02-16 DIAGNOSIS — Z13.220 SCREENING FOR HYPERLIPIDEMIA: ICD-10-CM

## 2021-02-16 DIAGNOSIS — I10 ESSENTIAL HYPERTENSION: ICD-10-CM

## 2021-02-16 LAB
ALBUMIN SERPL-MCNC: 3.6 G/DL (ref 3.4–5)
ALP SERPL-CCNC: 63 U/L (ref 40–150)
ALT SERPL W P-5'-P-CCNC: 21 U/L (ref 0–70)
ANION GAP SERPL CALCULATED.3IONS-SCNC: 6 MMOL/L (ref 3–14)
AST SERPL W P-5'-P-CCNC: 14 U/L (ref 0–45)
BILIRUB SERPL-MCNC: 0.7 MG/DL (ref 0.2–1.3)
BUN SERPL-MCNC: 29 MG/DL (ref 7–30)
CALCIUM SERPL-MCNC: 8.9 MG/DL (ref 8.5–10.1)
CHLORIDE SERPL-SCNC: 107 MMOL/L (ref 94–109)
CHOLEST SERPL-MCNC: 170 MG/DL
CO2 SERPL-SCNC: 28 MMOL/L (ref 20–32)
CREAT SERPL-MCNC: 1.1 MG/DL (ref 0.66–1.25)
CREAT UR-MCNC: 172 MG/DL
GFR SERPL CREATININE-BSD FRML MDRD: 67 ML/MIN/{1.73_M2}
GLUCOSE SERPL-MCNC: 103 MG/DL (ref 70–99)
HDLC SERPL-MCNC: 39 MG/DL
LDLC SERPL CALC-MCNC: 105 MG/DL
MICROALBUMIN UR-MCNC: 7 MG/L
MICROALBUMIN/CREAT UR: 4.08 MG/G CR (ref 0–17)
NONHDLC SERPL-MCNC: 131 MG/DL
POTASSIUM SERPL-SCNC: 3.9 MMOL/L (ref 3.4–5.3)
PROT SERPL-MCNC: 6.9 G/DL (ref 6.8–8.8)
SODIUM SERPL-SCNC: 141 MMOL/L (ref 133–144)
TRIGL SERPL-MCNC: 128 MG/DL

## 2021-02-16 PROCEDURE — 80053 COMPREHEN METABOLIC PANEL: CPT | Performed by: PHYSICIAN ASSISTANT

## 2021-02-16 PROCEDURE — 36415 COLL VENOUS BLD VENIPUNCTURE: CPT | Performed by: PHYSICIAN ASSISTANT

## 2021-02-16 PROCEDURE — 80061 LIPID PANEL: CPT | Performed by: PHYSICIAN ASSISTANT

## 2021-02-16 PROCEDURE — 82043 UR ALBUMIN QUANTITATIVE: CPT | Performed by: PHYSICIAN ASSISTANT

## 2021-02-22 DIAGNOSIS — I10 ESSENTIAL HYPERTENSION: ICD-10-CM

## 2021-02-23 ENCOUNTER — NURSE TRIAGE (OUTPATIENT)
Dept: NURSING | Facility: CLINIC | Age: 71
End: 2021-02-23

## 2021-02-23 RX ORDER — LISINOPRIL AND HYDROCHLOROTHIAZIDE 12.5; 2 MG/1; MG/1
TABLET ORAL
Qty: 180 TABLET | Refills: 0 | OUTPATIENT
Start: 2021-02-23

## 2021-02-23 NOTE — TELEPHONE ENCOUNTER
Denied Lisinopril-hydrochlorothiazide. Ordered 1/22/2021 180, 0 (90 days). Should be good until middle of April.     [facility] AV req and ordered.     Kady KING RN

## 2021-05-27 DIAGNOSIS — I10 ESSENTIAL HYPERTENSION: ICD-10-CM

## 2021-05-27 RX ORDER — AMLODIPINE BESYLATE 5 MG/1
TABLET ORAL
Qty: 30 TABLET | Refills: 0 | Status: SHIPPED | OUTPATIENT
Start: 2021-05-27

## 2021-05-27 RX ORDER — LISINOPRIL AND HYDROCHLOROTHIAZIDE 12.5; 2 MG/1; MG/1
TABLET ORAL
Qty: 60 TABLET | Refills: 0 | Status: SHIPPED | OUTPATIENT
Start: 2021-05-27

## 2021-05-27 NOTE — LETTER
May 27, 2021      Toby Zuluaga  [address] [address] [address]        Dear Mr. Toby Zuluaga,    We recently received a call from your pharmacy requesting a refill of your medication Lisinopril-Hydrochlorothiazide & Amlodipine.    We are contacting you today to notify you that you are due for a medication check for further refills.    We have authorized one refill of your medication to allow time for you to schedule your appointment.    Please call [phone] to schedule an appointment or if you have MyChart you can schedule with your provider as well.    Taking care of your health is important to us, an ongoing visits with your provider are vital to your care. We look forward to seeing you in the near future.    Thank you for using [facility] for your Medical Needs.          Sincerely,     Rodri Greer PA-C

## 2021-05-27 NOTE — TELEPHONE ENCOUNTER
Medication is being filled for 1 time refill only due to:  Patient needs to be seen because needs BP visit. last BP 12/2019..     01/22/2021 Rodri Greer PA-C Virtual Visit  Return in about 3 months (around 4/22/2021) for BP Recheck.     Needs OV with provider.    Viv Min RN

## 2021-06-22 NOTE — PROGRESS NOTES
Audiology Report:      History:  Toby Zuluaga is seen today for comprehensive hearing evaluation. He is accompanied by his wife at the visit today. Toby has a history of difficulty hearing in both ears for the past 2 years. 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 He denies a history of otalgia, otorrhea, tinnitus, dizziness, aural fullness, ear surgery, and family history of hearing loss.    Results:     Left Ear Right Ear   Otoscopy non-occluding cerumen non-occluding cerumen   Pure Tone Audiometry normal hearing from 250-1500 Hz sloping to severe sensorineural hearing loss  normal hearing from 250-1000 Hz sloping to severe sensorineural hearing loss    Word Recognition excellent excellent   Tympanometry normal (Type A)  shallow (Type As)     Transducer: Insert earphones and Circumaural headphones    Reliability was good  and there was good  SRT to PTA agreement.       Plan:  Results are discussed in detail.  He should return for retesting in 1-2 years. Toby is a candidate for hearing aids if motivated. He is provided a copy of his hearing test as well as the [place] [employer] Brochure about hearing aids. He is also provided hearing aid pricing information.    Please see audiogram under  media  and  audiogram  in the patient s chart.     Linda Miller, [facility]  [place] Licensed Audiologist #[id number]

## 2021-08-14 ENCOUNTER — HEALTH MAINTENANCE LETTER (OUTPATIENT)
Age: 71
End: 2021-08-14

## 2021-10-09 ENCOUNTER — HEALTH MAINTENANCE LETTER (OUTPATIENT)
Age: 71
End: 2021-10-09

## 2022-09-17 ENCOUNTER — HEALTH MAINTENANCE LETTER (OUTPATIENT)
Age: 72
End: 2022-09-17